# Patient Record
Sex: MALE | Race: WHITE | NOT HISPANIC OR LATINO | Employment: FULL TIME | URBAN - METROPOLITAN AREA
[De-identification: names, ages, dates, MRNs, and addresses within clinical notes are randomized per-mention and may not be internally consistent; named-entity substitution may affect disease eponyms.]

---

## 2022-05-01 ENCOUNTER — APPOINTMENT (EMERGENCY)
Dept: RADIOLOGY | Facility: HOSPITAL | Age: 42
End: 2022-05-01
Payer: COMMERCIAL

## 2022-05-01 ENCOUNTER — APPOINTMENT (EMERGENCY)
Dept: CT IMAGING | Facility: HOSPITAL | Age: 42
End: 2022-05-01
Payer: COMMERCIAL

## 2022-05-01 ENCOUNTER — HOSPITAL ENCOUNTER (OUTPATIENT)
Facility: HOSPITAL | Age: 42
Setting detail: OBSERVATION
Discharge: LEFT BEFORE DISCHARGE | End: 2022-05-02
Attending: EMERGENCY MEDICINE | Admitting: INTERNAL MEDICINE
Payer: COMMERCIAL

## 2022-05-01 DIAGNOSIS — L03.90 CELLULITIS: Primary | ICD-10-CM

## 2022-05-01 PROBLEM — E11.9 DIABETES MELLITUS (HCC): Status: ACTIVE | Noted: 2022-05-01

## 2022-05-01 PROBLEM — E66.09 CLASS 1 OBESITY DUE TO EXCESS CALORIES IN ADULT: Status: ACTIVE | Noted: 2022-05-01

## 2022-05-01 PROBLEM — L08.9 DIABETIC FOOT INFECTION (HCC): Status: ACTIVE | Noted: 2022-05-01

## 2022-05-01 PROBLEM — E66.811 CLASS 1 OBESITY DUE TO EXCESS CALORIES IN ADULT: Status: ACTIVE | Noted: 2022-05-01

## 2022-05-01 PROBLEM — E87.1 HYPONATREMIA: Status: ACTIVE | Noted: 2022-05-01

## 2022-05-01 PROBLEM — F11.10 OPIOID ABUSE (HCC): Status: ACTIVE | Noted: 2022-05-01

## 2022-05-01 PROBLEM — E11.628 DIABETIC FOOT INFECTION (HCC): Status: ACTIVE | Noted: 2022-05-01

## 2022-05-01 LAB
ALBUMIN SERPL BCP-MCNC: 3.8 G/DL (ref 3.5–5)
ALP SERPL-CCNC: 97 U/L (ref 46–116)
ALT SERPL W P-5'-P-CCNC: 40 U/L (ref 12–78)
ANION GAP SERPL CALCULATED.3IONS-SCNC: 10 MMOL/L (ref 4–13)
APTT PPP: 35 SECONDS (ref 23–37)
AST SERPL W P-5'-P-CCNC: 9 U/L (ref 5–45)
ATRIAL RATE: 97 BPM
BASOPHILS # BLD AUTO: 0.08 THOUSANDS/ΜL (ref 0–0.1)
BASOPHILS NFR BLD AUTO: 1 % (ref 0–1)
BILIRUB SERPL-MCNC: 0.42 MG/DL (ref 0.2–1)
BUN SERPL-MCNC: 15 MG/DL (ref 5–25)
CALCIUM SERPL-MCNC: 9.2 MG/DL (ref 8.3–10.1)
CHLORIDE SERPL-SCNC: 94 MMOL/L (ref 100–108)
CO2 SERPL-SCNC: 27 MMOL/L (ref 21–32)
CREAT SERPL-MCNC: 1.21 MG/DL (ref 0.6–1.3)
EOSINOPHIL # BLD AUTO: 0.22 THOUSAND/ΜL (ref 0–0.61)
EOSINOPHIL NFR BLD AUTO: 2 % (ref 0–6)
ERYTHROCYTE [DISTWIDTH] IN BLOOD BY AUTOMATED COUNT: 12.6 % (ref 11.6–15.1)
GFR SERPL CREATININE-BSD FRML MDRD: 74 ML/MIN/1.73SQ M
GLUCOSE SERPL-MCNC: 289 MG/DL (ref 65–140)
GLUCOSE SERPL-MCNC: 296 MG/DL (ref 65–140)
HCT VFR BLD AUTO: 42.2 % (ref 36.5–49.3)
HGB BLD-MCNC: 14 G/DL (ref 12–17)
IMM GRANULOCYTES # BLD AUTO: 0.06 THOUSAND/UL (ref 0–0.2)
IMM GRANULOCYTES NFR BLD AUTO: 0 % (ref 0–2)
INR PPP: 1.1 (ref 0.84–1.19)
LACTATE SERPL-SCNC: 1.4 MMOL/L (ref 0.5–2)
LYMPHOCYTES # BLD AUTO: 3.39 THOUSANDS/ΜL (ref 0.6–4.47)
LYMPHOCYTES NFR BLD AUTO: 24 % (ref 14–44)
MAGNESIUM SERPL-MCNC: 1.9 MG/DL (ref 1.6–2.6)
MCH RBC QN AUTO: 29.5 PG (ref 26.8–34.3)
MCHC RBC AUTO-ENTMCNC: 33.2 G/DL (ref 31.4–37.4)
MCV RBC AUTO: 89 FL (ref 82–98)
MONOCYTES # BLD AUTO: 1.05 THOUSAND/ΜL (ref 0.17–1.22)
MONOCYTES NFR BLD AUTO: 8 % (ref 4–12)
NEUTROPHILS # BLD AUTO: 9.12 THOUSANDS/ΜL (ref 1.85–7.62)
NEUTS SEG NFR BLD AUTO: 65 % (ref 43–75)
NRBC BLD AUTO-RTO: 0 /100 WBCS
P AXIS: 63 DEGREES
PLATELET # BLD AUTO: 359 THOUSANDS/UL (ref 149–390)
PMV BLD AUTO: 9.4 FL (ref 8.9–12.7)
POTASSIUM SERPL-SCNC: 4.2 MMOL/L (ref 3.5–5.3)
PR INTERVAL: 160 MS
PROCALCITONIN SERPL-MCNC: 0.41 NG/ML
PROT SERPL-MCNC: 8.5 G/DL (ref 6.4–8.2)
PROTHROMBIN TIME: 13.7 SECONDS (ref 11.6–14.5)
QRS AXIS: 62 DEGREES
QRSD INTERVAL: 82 MS
QT INTERVAL: 352 MS
QTC INTERVAL: 447 MS
RBC # BLD AUTO: 4.74 MILLION/UL (ref 3.88–5.62)
SODIUM SERPL-SCNC: 131 MMOL/L (ref 136–145)
T WAVE AXIS: 43 DEGREES
VENTRICULAR RATE: 97 BPM
WBC # BLD AUTO: 13.92 THOUSAND/UL (ref 4.31–10.16)

## 2022-05-01 PROCEDURE — 36415 COLL VENOUS BLD VENIPUNCTURE: CPT | Performed by: EMERGENCY MEDICINE

## 2022-05-01 PROCEDURE — 82948 REAGENT STRIP/BLOOD GLUCOSE: CPT

## 2022-05-01 PROCEDURE — 99220 PR INITIAL OBSERVATION CARE/DAY 70 MINUTES: CPT | Performed by: INTERNAL MEDICINE

## 2022-05-01 PROCEDURE — 73590 X-RAY EXAM OF LOWER LEG: CPT

## 2022-05-01 PROCEDURE — 87040 BLOOD CULTURE FOR BACTERIA: CPT | Performed by: EMERGENCY MEDICINE

## 2022-05-01 PROCEDURE — 99285 EMERGENCY DEPT VISIT HI MDM: CPT

## 2022-05-01 PROCEDURE — 96365 THER/PROPH/DIAG IV INF INIT: CPT

## 2022-05-01 PROCEDURE — 85610 PROTHROMBIN TIME: CPT | Performed by: EMERGENCY MEDICINE

## 2022-05-01 PROCEDURE — 73630 X-RAY EXAM OF FOOT: CPT

## 2022-05-01 PROCEDURE — 99285 EMERGENCY DEPT VISIT HI MDM: CPT | Performed by: EMERGENCY MEDICINE

## 2022-05-01 PROCEDURE — 83735 ASSAY OF MAGNESIUM: CPT | Performed by: EMERGENCY MEDICINE

## 2022-05-01 PROCEDURE — 84145 PROCALCITONIN (PCT): CPT | Performed by: EMERGENCY MEDICINE

## 2022-05-01 PROCEDURE — 85025 COMPLETE CBC W/AUTO DIFF WBC: CPT | Performed by: EMERGENCY MEDICINE

## 2022-05-01 PROCEDURE — 93010 ELECTROCARDIOGRAM REPORT: CPT | Performed by: INTERNAL MEDICINE

## 2022-05-01 PROCEDURE — 73701 CT LOWER EXTREMITY W/DYE: CPT

## 2022-05-01 PROCEDURE — 93005 ELECTROCARDIOGRAM TRACING: CPT

## 2022-05-01 PROCEDURE — 85730 THROMBOPLASTIN TIME PARTIAL: CPT | Performed by: EMERGENCY MEDICINE

## 2022-05-01 PROCEDURE — 80053 COMPREHEN METABOLIC PANEL: CPT | Performed by: EMERGENCY MEDICINE

## 2022-05-01 PROCEDURE — G1004 CDSM NDSC: HCPCS

## 2022-05-01 PROCEDURE — 83605 ASSAY OF LACTIC ACID: CPT | Performed by: EMERGENCY MEDICINE

## 2022-05-01 PROCEDURE — 96361 HYDRATE IV INFUSION ADD-ON: CPT

## 2022-05-01 RX ORDER — DICYCLOMINE HYDROCHLORIDE 10 MG/1
10 CAPSULE ORAL EVERY 6 HOURS PRN
Status: DISCONTINUED | OUTPATIENT
Start: 2022-05-01 | End: 2022-05-02 | Stop reason: HOSPADM

## 2022-05-01 RX ORDER — IBUPROFEN 600 MG/1
600 TABLET ORAL EVERY 6 HOURS PRN
Status: DISCONTINUED | OUTPATIENT
Start: 2022-05-01 | End: 2022-05-02 | Stop reason: HOSPADM

## 2022-05-01 RX ORDER — TRAZODONE HYDROCHLORIDE 50 MG/1
50 TABLET ORAL
Status: DISCONTINUED | OUTPATIENT
Start: 2022-05-01 | End: 2022-05-02 | Stop reason: HOSPADM

## 2022-05-01 RX ORDER — HYDROXYZINE HYDROCHLORIDE 25 MG/1
25 TABLET, FILM COATED ORAL EVERY 6 HOURS PRN
Status: DISCONTINUED | OUTPATIENT
Start: 2022-05-01 | End: 2022-05-02 | Stop reason: HOSPADM

## 2022-05-01 RX ORDER — GLIPIZIDE 5 MG/1
10 TABLET ORAL
Status: DISCONTINUED | OUTPATIENT
Start: 2022-05-02 | End: 2022-05-02 | Stop reason: HOSPADM

## 2022-05-01 RX ORDER — NICOTINE 21 MG/24HR
1 PATCH, TRANSDERMAL 24 HOURS TRANSDERMAL DAILY
Status: DISCONTINUED | OUTPATIENT
Start: 2022-05-02 | End: 2022-05-02 | Stop reason: HOSPADM

## 2022-05-01 RX ORDER — INSULIN GLARGINE 100 [IU]/ML
30 INJECTION, SOLUTION SUBCUTANEOUS
Status: DISCONTINUED | OUTPATIENT
Start: 2022-05-01 | End: 2022-05-02 | Stop reason: HOSPADM

## 2022-05-01 RX ORDER — ENOXAPARIN SODIUM 100 MG/ML
40 INJECTION SUBCUTANEOUS DAILY
Status: DISCONTINUED | OUTPATIENT
Start: 2022-05-02 | End: 2022-05-02 | Stop reason: HOSPADM

## 2022-05-01 RX ORDER — ONDANSETRON 2 MG/ML
4 INJECTION INTRAMUSCULAR; INTRAVENOUS EVERY 6 HOURS PRN
Status: DISCONTINUED | OUTPATIENT
Start: 2022-05-01 | End: 2022-05-02 | Stop reason: HOSPADM

## 2022-05-01 RX ORDER — INSULIN LISPRO 100 [IU]/ML
2-12 INJECTION, SOLUTION INTRAVENOUS; SUBCUTANEOUS
Status: DISCONTINUED | OUTPATIENT
Start: 2022-05-02 | End: 2022-05-02 | Stop reason: HOSPADM

## 2022-05-01 RX ORDER — LANOLIN ALCOHOL/MO/W.PET/CERES
3 CREAM (GRAM) TOPICAL
Status: DISCONTINUED | OUTPATIENT
Start: 2022-05-01 | End: 2022-05-02 | Stop reason: HOSPADM

## 2022-05-01 RX ORDER — CLINDAMYCIN PHOSPHATE 600 MG/50ML
600 INJECTION INTRAVENOUS ONCE
Status: COMPLETED | OUTPATIENT
Start: 2022-05-01 | End: 2022-05-01

## 2022-05-01 RX ORDER — LOPERAMIDE HYDROCHLORIDE 2 MG/1
4 CAPSULE ORAL 3 TIMES DAILY PRN
Status: DISCONTINUED | OUTPATIENT
Start: 2022-05-01 | End: 2022-05-02 | Stop reason: HOSPADM

## 2022-05-01 RX ORDER — BUPRENORPHINE 2 MG/1
6 TABLET SUBLINGUAL ONCE
Status: COMPLETED | OUTPATIENT
Start: 2022-05-01 | End: 2022-05-01

## 2022-05-01 RX ORDER — CLONIDINE HYDROCHLORIDE 0.1 MG/1
0.1 TABLET ORAL EVERY 8 HOURS PRN
Status: DISCONTINUED | OUTPATIENT
Start: 2022-05-01 | End: 2022-05-02 | Stop reason: HOSPADM

## 2022-05-01 RX ORDER — BUPRENORPHINE 2 MG/1
4 TABLET SUBLINGUAL 2 TIMES DAILY
Status: DISCONTINUED | OUTPATIENT
Start: 2022-05-02 | End: 2022-05-02 | Stop reason: HOSPADM

## 2022-05-01 RX ORDER — GABAPENTIN 300 MG/1
300 CAPSULE ORAL 3 TIMES DAILY
Status: DISCONTINUED | OUTPATIENT
Start: 2022-05-01 | End: 2022-05-02 | Stop reason: HOSPADM

## 2022-05-01 RX ADMIN — IOHEXOL 100 ML: 350 INJECTION, SOLUTION INTRAVENOUS at 16:58

## 2022-05-01 RX ADMIN — GABAPENTIN 300 MG: 300 CAPSULE ORAL at 20:27

## 2022-05-01 RX ADMIN — INSULIN GLARGINE 30 UNITS: 100 INJECTION, SOLUTION SUBCUTANEOUS at 22:22

## 2022-05-01 RX ADMIN — VANCOMYCIN HYDROCHLORIDE 1500 MG: 1 INJECTION, POWDER, LYOPHILIZED, FOR SOLUTION INTRAVENOUS at 22:16

## 2022-05-01 RX ADMIN — Medication 3 MG: at 22:28

## 2022-05-01 RX ADMIN — SODIUM CHLORIDE 1000 ML: 0.9 INJECTION, SOLUTION INTRAVENOUS at 16:03

## 2022-05-01 RX ADMIN — PIPERACILLIN AND TAZOBACTAM 4.5 G: 4; .5 INJECTION, POWDER, LYOPHILIZED, FOR SOLUTION INTRAVENOUS at 20:44

## 2022-05-01 RX ADMIN — IBUPROFEN 600 MG: 600 TABLET ORAL at 20:30

## 2022-05-01 RX ADMIN — BUPRENORPHINE 6 MG: 2 TABLET SUBLINGUAL at 20:27

## 2022-05-01 RX ADMIN — CLINDAMYCIN PHOSPHATE 600 MG: 600 INJECTION, SOLUTION INTRAVENOUS at 16:08

## 2022-05-01 NOTE — H&P
[address]  H&PDona Nora 1980, 37 y o  male MRN: [id number]  Unit/Bed#: -[place] Encounter: [id number]  Primary Care Provider: No primary care provider on file  Date and time admitted to hospital: 5/1/2022  3:03 PM    * Diabetic foot infection [facility])  Assessment & Plan  Lab Results   Component Value Date    HGBA1C 10 0 (H) 04/25/2022     He with progressive left lower extremity edema, warmth, erythema, with ist/2nd toe ulceration with missed doses cephalexin and Bactrim while at [facility] detox center found with leukocytosis, elevated procalcitonin with CT lower extremity plantar ulcer  first toe interphalangeal joint with adjacent soft tissue swelling and increased vascularity, no abscess, foreign body or osteo  -will give vancomycin and Zosyn  -follow-up blood culture  -avoid opioids for pain control  -continue ibuprofen p r n , acetaminophen p r n   -ID and Podiatry consult  -consider MRI foot tomorrow          Class 1 obesity due to excess calories in adult  Assessment & Plan  Patient counseled on lifestyle modification with diet, exercise and weight loss    Diabetes mellitus (HCC)  Assessment & Plan  Lab Results   Component Value Date    HGBA1C 10 0 (H) 04/25/2022   Uncontrolled with A1c 10  Continue Lantus 30 units at bedtime, glipizide 10 mg b i d    Will hold metformin post IV contrast  On gentle IVF  Add insulin sliding scale, Accu-Chek      Recent Labs     05/01/22  1634   POCGLU 296*       Blood Sugar Average: Last 72 hrs:  (P) 296    Opioid abuse ([facility] [address] )  Assessment & Plan  Patient checked into [facility] for fentanyl and methamphetamine detox last Wednesday  -no signs of withdrawal  -continue current regimen buprenorphine, clonidine p r n , gabapentin tid  -supportive care     Hyponatremia  Assessment & Plan  Corrected [id number] for glucose  Optimize glycemic control    VTE Pharmacologic Prophylaxis: VTE Score: 5 High Risk (Score >/= 5) - Pharmacological DVT Prophylaxis Ordered: enoxaparin (Lovenox)  Sequential Compression Devices Ordered  Code Status: Level 1 - Full Code   Discussion with family: Patient declined call to   Anticipated Length of Stay: Patient will be admitted on an inpatient basis with an anticipated length of stay of greater than 2 midnights secondary to Diabetic foot infection  Total Time for Visit, including Counseling / Coordination of Care: 60 minutes Greater than 50% of this total time spent on direct patient counseling and coordination of care  Chief Complaint: left leg infection    History of Present Illness:  Dany Chase is a 37 y o  male with a PMH of DMII, opioid abuse who presents from [facility] with worsening left leg swelling, redness, warmth and pain  Patient was seen at [facility] ED on 4/21 diagnosed with cellulitis discharge on Bactrim and Keflex  Patient checked in to rehab for fentanyl and methamphetamine abuse [facility] last Wednesday reports he has not been given his antibiotics regularly  Today, he noted increased leg swelling, redness warmth took acetaminophen with no relief  He had fever 100 yesterday  He denies chills, nausea, vomiting, chest pain, shortness of breath or any other complaint  He reports history of cellulitis many years ago  Review of Systems:  Review of Systems  Comprehensive review of systems negative except in above HPI  Past Medical and Surgical History:   Past Medical History:   Diagnosis Date    Class 1 obesity in adult     Diabetes mellitus ([facility] [address] )     Opioid abuse ([facility] [address] )        Past Surgical History:   Procedure Laterality Date    NO PAST SURGERIES N/A        Meds/Allergies:  Prior to Admission medications    Not on File     I have reviewed home medications with patient personally      Allergies: No Known Allergies    Social History:  Marital Status:    Occupation:   Patient Pre-hospital Living Situation: Skilled Nursing Facility: [address] [place] [address]  Patient Pre-hospital Level of Mobility: walks  Patient Pre-hospital Diet Restrictions:   Substance Use History:   Social History     Substance and Sexual Activity   Alcohol Use Not Currently     Social History     Tobacco Use   Smoking Status Current Every Day Smoker   Smokeless Tobacco Never Used     Social History     Substance and Sexual Activity   Drug Use Not Currently    Types: Fentanyl       Family History:  Family History   Problem Relation Age of Onset    Diabetes Mother        Physical Exam:     Vitals:   Blood Pressure: 131/79 (05/01/22 1954)  Pulse: 97 (05/01/22 1954)  Temperature: 100 2 °F (37 9 °C) (05/01/22 1954)  Temp Source: Oral (05/01/22 1815)  Respirations: 20 (05/01/22 1815)  Height: 6' 5" (195 6 cm) (05/01/22 1434)  Weight - Scale: 120 kg (265 lb) (05/01/22 1434)  SpO2: 94 % (05/01/22 1954)    Physical Exam  Vitals and nursing note reviewed  Constitutional:       Appearance: Normal appearance  He is well-developed  HENT:      Head: Normocephalic and atraumatic  Eyes:      Extraocular Movements: Extraocular movements intact  Conjunctiva/sclera: Conjunctivae normal       Pupils: Pupils are equal, round, and reactive to light  Cardiovascular:      Rate and Rhythm: Normal rate and regular rhythm  Pulses: Normal pulses  Heart sounds: Normal heart sounds  No murmur heard  Pulmonary:      Effort: Pulmonary effort is normal  No respiratory distress  Breath sounds: Normal breath sounds  Abdominal:      General: Abdomen is flat  Bowel sounds are normal  There is no distension  Palpations: Abdomen is soft  Tenderness: There is no abdominal tenderness  Musculoskeletal:      Cervical back: Neck supple  Left lower leg: Edema present  Skin:     General: Skin is warm and dry        Comments: Diffuse left lower extremity erythema, warmth, mild tenderness with lymphanitis streak upto thigh  Left Ist/2nd dorsal toes ulceration, mild tenderness, no expose bone   Neurological:      Mental Status: He is alert     Psychiatric:         Mood and Affect: Mood normal          Behavior: Behavior normal           Additional Data:     Lab Results:  Results from last 7 days   Lab Units 05/01/22  1735   WBC Thousand/uL 13 92*   HEMOGLOBIN g/dL 14 0   HEMATOCRIT % 42 2   PLATELETS Thousands/uL 359   NEUTROS PCT % 65   LYMPHS PCT % 24   MONOS PCT % 8   EOS PCT % 2     Results from last 7 days   Lab Units 05/01/22  1602   SODIUM mmol/L 131*   POTASSIUM mmol/L 4 2   CHLORIDE mmol/L 94*   CO2 mmol/L 27   BUN mg/dL 15   CREATININE mg/dL 1 21   ANION GAP mmol/L 10   CALCIUM mg/dL 9 2   ALBUMIN g/dL 3 8   TOTAL BILIRUBIN mg/dL 0 42   ALK PHOS U/L 97   ALT U/L 40   AST U/L 9   GLUCOSE RANDOM mg/dL 289*     Results from last 7 days   Lab Units 05/01/22  1602   INR  1 10     Results from last 7 days   Lab Units 05/01/22  1634   POC GLUCOSE mg/dl 296*     Results from last 7 days   Lab Units 04/25/22  1439   HEMOGLOBIN A1C % 10 0*     Results from last 7 days   Lab Units 05/01/22  1602   LACTIC ACID mmol/L 1 4   PROCALCITONIN ng/ml 0 41*       Imaging: Reviewed radiology reports from this admission including: CT lower extremity  CT lower extremity w contrast left   Final Result by Natacha Gonzales MD (05/01 1804)      Plantar first toe ulceration, without evidence of underlying osteomyelitis, acute fracture, or radiopaque foreign body      Diffuse soft tissue swelling is seen particularly about the ulcer site, without evidence of generalized soft tissue air or focal abscess         Workstation performed: [id number]         XR tibia fibula 2 views LEFT   ED Interpretation by Jacques Torres DO (05/01 1635)    concern for possible soft tissue gas, will follow-up CT      XR foot 3+ views LEFT   ED Interpretation by Jacques Torres DO (05/01 1635)   No elie osteomyelitis seen, no gas          EKG and Other Studies Reviewed on Admission: ·   · Please Note: This note has been constructed using a voice recognition system   **

## 2022-05-01 NOTE — ED PROVIDER NOTES
History  Chief Complaint   Patient presents with    Leg Swelling     redness and swelling in left leg was taking antibiotics but missed a few doses     Patient is a 50-year-old male past medical history diabetes presenting with left leg swelling  Patient states that he was seen on 04/21 at outside hospital and diagnosed with skin infection, placed on antibiotics and was told to return next day via phone call as there was concern for osteomyelitis on x-ray he would require CT  Patient did not stay for CT that day and states that he has been taking his Augmentin and Bactrim however when he 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 he was not given his antibiotics regularly  He states that today he began with swelling and redness spreading up from his foot up his leg and states aching pain which is constant and radiating upward  Worse with pressure  He states he is taking Tylenol with no relief  He had a fever yesterday to 100  Did not take any medications today  Denies any chest pain, shortness of breath, rashes, vision changes, dizziness, nausea vomiting, dysuria  None       Past Medical History:   Diagnosis Date    Class 1 obesity in adult     Diabetes mellitus (HCC)     Opioid abuse ([facility] [address] )        Past Surgical History:   Procedure Laterality Date    NO PAST SURGERIES N/A        Family History   Problem Relation Age of Onset    Diabetes Mother      I have reviewed and agree with the history as documented  E-Cigarette/Vaping     E-Cigarette/Vaping Substances     Social History     Tobacco Use    Smoking status: Current Every Day Smoker    Smokeless tobacco: Never Used   Substance Use Topics    Alcohol use: Not Currently    Drug use: Not Currently     Types: Fentanyl       Review of Systems   All other systems reviewed and are negative  Physical Exam  Physical Exam  Vitals reviewed  Constitutional:       General: He is not in acute distress       Appearance: Normal appearance  He is not ill-appearing  HENT:      Mouth/Throat:      Mouth: Mucous membranes are moist    Eyes:      Conjunctiva/sclera: Conjunctivae normal    Cardiovascular:      Rate and Rhythm: Normal rate and regular rhythm  Heart sounds: Normal heart sounds  Pulmonary:      Effort: Pulmonary effort is normal       Breath sounds: Normal breath sounds  Abdominal:      General: Abdomen is flat  Palpations: Abdomen is soft  Tenderness: There is no abdominal tenderness  Musculoskeletal:         General: Swelling present  Normal range of motion  Cervical back: Neck supple  Comments: Patient has stage II ulcer to the left great toe and also to the right great toe with erythema and +1-2 2 pitting edema from foot to mid calf, erythema only to the anterior and medial portions of the calf, not circumferential with streaking up to mid thigh, intact pulses, sensation, no crepitus   Skin:     General: Skin is warm and dry  Capillary Refill: Capillary refill takes less than 2 seconds  Neurological:      General: No focal deficit present  Mental Status: He is alert  Sensory: No sensory deficit  Motor: No weakness     Psychiatric:         Mood and Affect: Mood normal          Vital Signs  ED Triage Vitals   Temperature Pulse Respirations Blood Pressure SpO2   05/01/22 1433 05/01/22 1434 05/01/22 1433 05/01/22 1434 05/01/22 1434   98 °F (36 7 °C) (!) 109 18 152/74 98 %      Temp Source Heart Rate Source Patient Position - Orthostatic VS BP Location FiO2 (%)   05/01/22 1433 05/01/22 1700 05/01/22 1433 05/01/22 1433 --   Tympanic Monitor Sitting Left arm       Pain Score       05/01/22 2027       5           Vitals:    05/01/22 1434 05/01/22 1700 05/01/22 1815 05/01/22 1954   BP: 152/74 153/72 153/72 131/79   Pulse: (!) 109 96 93 97   Patient Position - Orthostatic VS: Sitting Sitting Lying          Visual Acuity      ED Medications  Medications   nicotine (NICODERM CQ) 14 mg/24hr TD 24 hr patch 1 patch (has no administration in time range)   enoxaparin (LOVENOX) subcutaneous injection 40 mg (has no administration in time range)   cloNIDine (CATAPRES) tablet 0 1 mg (has no administration in time range)   dicyclomine (BENTYL) capsule 10 mg (has no administration in time range)   hydrOXYzine HCL (ATARAX) tablet 25 mg (has no administration in time range)   ibuprofen (MOTRIN) tablet 600 mg (600 mg Oral Given 5/1/22 2030)   loperamide (IMODIUM) capsule 4 mg (has no administration in time range)   melatonin tablet 3 mg (has no administration in time range)   ondansetron (ZOFRAN) injection 4 mg (has no administration in time range)   traZODone (DESYREL) tablet 50 mg (has no administration in time range)   insulin glargine (LANTUS) subcutaneous injection 30 Units 0 3 mL (has no administration in time range)   losartan potassium-hydrochlorothiazide (HYZAAR 100/25) combo dose (has no administration in time range)   glipiZIDE (GLUCOTROL) tablet 10 mg (has no administration in time range)   gabapentin (NEURONTIN) capsule 300 mg (300 mg Oral Given 5/1/22 2027)   buprenorphine (SUBUTEX) 2 mg SL tablet 4 mg (has no administration in time range)   insulin lispro (HumaLOG) 100 units/mL subcutaneous injection 2-12 Units (has no administration in time range)   piperacillin-tazobactam (ZOSYN) 4 5 g in sodium chloride 0 9 % 100 mL IVPB (4 5 g Intravenous New Bag 5/1/22 2044)   vancomycin (VANCOCIN) 1500 mg in sodium chloride 0 9% 250 mL IVPB (has no administration in time range)   clindamycin (CLEOCIN) IVPB (premix in dextrose) 600 mg 50 mL (0 mg Intravenous Stopped 5/1/22 1645)   sodium chloride 0 9 % bolus 1,000 mL (0 mL Intravenous Stopped 5/1/22 1819)   iohexol (OMNIPAQUE) 350 MG/ML injection (SINGLE-DOSE) 100 mL (100 mL Intravenous Given 5/1/22 1658)   buprenorphine (SUBUTEX) 2 mg SL tablet 6 mg (6 mg Sublingual Given 5/1/22 2027)       Diagnostic Studies  Results Reviewed     Procedure Component Value Units Date/Time    Platelet count [[id number]]     Lab Status: No result Specimen: Blood     CBC and differential [[id number]]  (Abnormal) Collected: 05/01/22 1735    Lab Status: Final result Specimen: Blood from Arm, Right Updated: 05/01/22 1822     WBC 13 92 Thousand/uL      RBC 4 74 Million/uL      Hemoglobin 14 0 g/dL      Hematocrit 42 2 %      MCV 89 fL      MCH 29 5 pg      MCHC 33 2 g/dL      RDW 12 6 %      MPV 9 4 fL      Platelets 119 Thousands/uL      nRBC 0 /100 WBCs      Neutrophils Relative 65 %      Immat GRANS % 0 %      Lymphocytes Relative 24 %      Monocytes Relative 8 %      Eosinophils Relative 2 %      Basophils Relative 1 %      Neutrophils Absolute 9 12 Thousands/µL      Immature Grans Absolute 0 06 Thousand/uL      Lymphocytes Absolute 3 39 Thousands/µL      Monocytes Absolute 1 05 Thousand/µL      Eosinophils Absolute 0 22 Thousand/µL      Basophils Absolute 0 08 Thousands/µL     Procalcitonin [[id number]]  (Abnormal) Collected: 05/01/22 1602    Lab Status: Final result Specimen: Blood from Arm, Right Updated: 05/01/22 1643     Procalcitonin 0 41 ng/ml     Fingerstick Glucose (POCT) [[id number]]  (Abnormal) Collected: 05/01/22 1634    Lab Status: Final result Updated: 05/01/22 1636     POC Glucose 296 mg/dl     Lactic acid [[id number]]  (Normal) Collected: 05/01/22 1602    Lab Status: Final result Specimen: Blood from Arm, Right Updated: 05/01/22 1635     LACTIC ACID 1 4 mmol/L     Narrative:      Result may be elevated if tourniquet was used during collection      Comprehensive metabolic panel [[id number]]  (Abnormal) Collected: 05/01/22 1602    Lab Status: Final result Specimen: Blood from Arm, Right Updated: 05/01/22 1633     Sodium 131 mmol/L      Potassium 4 2 mmol/L      Chloride 94 mmol/L      CO2 27 mmol/L      ANION GAP 10 mmol/L      BUN 15 mg/dL      Creatinine 1 21 mg/dL      Glucose 289 mg/dL      Calcium 9 2 mg/dL      AST 9 U/L      ALT 40 U/L      Alkaline Phosphatase 97 U/L Total Protein 8 5 g/dL      Albumin 3 8 g/dL      Total Bilirubin 0 42 mg/dL      eGFR 74 ml/min/1 73sq m     Narrative:      [place] guidelines for Chronic Kidney Disease (CKD):     Stage 1 with normal or high GFR (GFR > 90 mL/min/1 73 square meters)    Stage 2 Mild CKD (GFR = 60-89 mL/min/1 73 square meters)    Stage 3A Moderate CKD (GFR = 45-59 mL/min/1 73 square meters)    Stage 3B Moderate CKD (GFR = 30-44 mL/min/1 73 square meters)    Stage 4 Severe CKD (GFR = 15-29 mL/min/1 73 square meters)    Stage 5 End Stage CKD (GFR <15 mL/min/1 73 square meters)  Note: GFR calculation is accurate only with a steady state creatinine    Magnesium [[id number]]  (Normal) Collected: 05/01/22 1602    Lab Status: Final result Specimen: Blood from Arm, Right Updated: 05/01/22 1633     Magnesium 1 9 mg/dL     Protime-INR [[id number]]  (Normal) Collected: 05/01/22 1602    Lab Status: Final result Specimen: Blood from Arm, Right Updated: 05/01/22 1629     Protime 13 7 seconds      INR 1 10    APTT [[id number]]  (Normal) Collected: 05/01/22 1602    Lab Status: Final result Specimen: Blood from Arm, Right Updated: 05/01/22 1629     PTT 35 seconds     Blood culture #2 [[id number]] Collected: 05/01/22 1602    Lab Status: In process Specimen: Blood from Arm, Left Updated: 05/01/22 1609    Blood culture #1 [[id number]] Collected: 05/01/22 1602    Lab Status:  In process Specimen: Blood from Arm, Right Updated: 05/01/22 1609                 CT lower extremity w contrast left   Final Result by Philippe Massey MD (05/01 1804)      Plantar first toe ulceration, without evidence of underlying osteomyelitis, acute fracture, or radiopaque foreign body      Diffuse soft tissue swelling is seen particularly about the ulcer site, without evidence of generalized soft tissue air or focal abscess         Workstation performed: [id number]         XR tibia fibula 2 views LEFT   ED Interpretation by Vern Alvarenga DO (05/01 1635)    concern for possible soft tissue gas, will follow-up CT      XR foot 3+ views LEFT   ED Interpretation by Michael Ruiz DO (05/01 1635)   No elie osteomyelitis seen, no gas                 Procedures  ECG 12 Lead Documentation Only    Date/Time: 5/1/2022 4:12 PM  Performed by: Michael Ruiz DO  Authorized by: Michael Ruiz DO     ECG reviewed by me, the ED Provider: yes    Patient location:  ED  Previous ECG:     Previous ECG:  Unavailable  Interpretation:     Interpretation: normal    Rate:     ECG rate assessment: normal    Rhythm:     Rhythm: sinus rhythm    Ectopy:     Ectopy: none    QRS:     QRS axis:  Normal    QRS intervals:  Normal  Conduction:     Conduction: normal    ST segments:     ST segments:  Normal  T waves:     T waves: normal               ED Course                               SBIRT 22yo+      Most Recent Value   SBIRT (22 yo +)    In order to provide better care to our patients, we are screening all of our patients for alcohol and drug use  Would it be okay to ask you these screening questions? Yes Filed at: 05/01/2022 1615   Initial Alcohol Screen: US AUDIT-C     1  How often do you have a drink containing alcohol? 0 Filed at: 05/01/2022 1615   2  How many drinks containing alcohol do you have on a typical day you are drinking? 0 Filed at: 05/01/2022 1615   3a  Male UNDER 65: How often do you have five or more drinks on one occasion? 0 Filed at: 05/01/2022 1615   3b  FEMALE Any Age, or MALE 65+: How often do you have 4 or more drinks on one occassion? 0 Filed at: 05/01/2022 1615   Audit-C Score 0 Filed at: 05/01/2022 1615   DONTAE: How many times in the past year have you    Used an illegal drug or used a prescription medication for non-medical reasons?  Never Filed at: 05/01/2022 1615                    MDM  Number of Diagnoses or Management Options  Diagnosis management comments: Patient is a 30-year-old male past medical history diabetes presenting with cellulitis, rule out osteomyelitis  Patient is well-appearing bedside though with tachycardia but in no acute distress  He has erythema which is not circumferential to the left lower extremity spreading from foot to calf to mid thigh with streaking  Will give antibiotics as patient is cellulitis with failure of outpatient antibiotics, obtain CT of the lower extremity to assess for osteomyelitis as chart from outside hospital reveals that there was concern based on x-ray, obtain septic workup and admit  Disposition  Final diagnoses:   Cellulitis     Time reflects when diagnosis was documented in both MDM as applicable and the Disposition within this note     Time User Action Codes Description Comment    5/1/2022  6:32 PM Ceci Tiwari Add [L03 90] Cellulitis       ED Disposition     ED Disposition Condition Date/Time Comment    Admit Stable Sun May 1, 2022  6:32 PM Case was discussed with Dr Joslyn Montano and the patient's admission status was agreed to be Admission Status: observation status to the service of Dr Joslyn Montano   Follow-up Information    None         There are no discharge medications for this patient  No discharge procedures on file      PDMP Review     None          ED Provider  Electronically Signed by           Bryon William DO  05/01/22 6473

## 2022-05-01 NOTE — ED NOTES
Patient transported to [facility], [address] [place] [address]  05/01/22 3684

## 2022-05-02 VITALS
HEIGHT: 77 IN | BODY MASS INDEX: 31.29 KG/M2 | WEIGHT: 265 LBS | SYSTOLIC BLOOD PRESSURE: 139 MMHG | OXYGEN SATURATION: 94 % | DIASTOLIC BLOOD PRESSURE: 77 MMHG | RESPIRATION RATE: 18 BRPM | HEART RATE: 95 BPM | TEMPERATURE: 98.3 F

## 2022-05-02 LAB
ANION GAP SERPL CALCULATED.3IONS-SCNC: 10 MMOL/L (ref 4–13)
BUN SERPL-MCNC: 12 MG/DL (ref 5–25)
CALCIUM SERPL-MCNC: 8.9 MG/DL (ref 8.3–10.1)
CHLORIDE SERPL-SCNC: 100 MMOL/L (ref 100–108)
CO2 SERPL-SCNC: 26 MMOL/L (ref 21–32)
CREAT SERPL-MCNC: 1.05 MG/DL (ref 0.6–1.3)
ERYTHROCYTE [DISTWIDTH] IN BLOOD BY AUTOMATED COUNT: 12.8 % (ref 11.6–15.1)
GFR SERPL CREATININE-BSD FRML MDRD: 88 ML/MIN/1.73SQ M
GLUCOSE P FAST SERPL-MCNC: 189 MG/DL (ref 65–99)
GLUCOSE SERPL-MCNC: 165 MG/DL (ref 65–140)
GLUCOSE SERPL-MCNC: 189 MG/DL (ref 65–140)
GLUCOSE SERPL-MCNC: 199 MG/DL (ref 65–140)
GLUCOSE SERPL-MCNC: 245 MG/DL (ref 65–140)
HCT VFR BLD AUTO: 41.5 % (ref 36.5–49.3)
HGB BLD-MCNC: 13.4 G/DL (ref 12–17)
MCH RBC QN AUTO: 28.9 PG (ref 26.8–34.3)
MCHC RBC AUTO-ENTMCNC: 32.3 G/DL (ref 31.4–37.4)
MCV RBC AUTO: 89 FL (ref 82–98)
PLATELET # BLD AUTO: 348 THOUSANDS/UL (ref 149–390)
PMV BLD AUTO: 9.7 FL (ref 8.9–12.7)
POTASSIUM SERPL-SCNC: 4.2 MMOL/L (ref 3.5–5.3)
RBC # BLD AUTO: 4.64 MILLION/UL (ref 3.88–5.62)
SODIUM SERPL-SCNC: 136 MMOL/L (ref 136–145)
WBC # BLD AUTO: 13.57 THOUSAND/UL (ref 4.31–10.16)

## 2022-05-02 PROCEDURE — 99217 PR OBSERVATION CARE DISCHARGE MANAGEMENT: CPT

## 2022-05-02 PROCEDURE — NC001 PR NO CHARGE: Performed by: PHYSICIAN ASSISTANT

## 2022-05-02 PROCEDURE — 82948 REAGENT STRIP/BLOOD GLUCOSE: CPT

## 2022-05-02 PROCEDURE — 85027 COMPLETE CBC AUTOMATED: CPT | Performed by: INTERNAL MEDICINE

## 2022-05-02 PROCEDURE — 92610 EVALUATE SWALLOWING FUNCTION: CPT

## 2022-05-02 PROCEDURE — 80048 BASIC METABOLIC PNL TOTAL CA: CPT | Performed by: INTERNAL MEDICINE

## 2022-05-02 RX ORDER — ACETAMINOPHEN 325 MG/1
650 TABLET ORAL EVERY 6 HOURS PRN
Status: DISCONTINUED | OUTPATIENT
Start: 2022-05-02 | End: 2022-05-02 | Stop reason: HOSPADM

## 2022-05-02 RX ORDER — SODIUM CHLORIDE 9 MG/ML
100 INJECTION, SOLUTION INTRAVENOUS CONTINUOUS
Status: DISCONTINUED | OUTPATIENT
Start: 2022-05-02 | End: 2022-05-02 | Stop reason: HOSPADM

## 2022-05-02 RX ADMIN — VANCOMYCIN HYDROCHLORIDE 1500 MG: 1 INJECTION, POWDER, LYOPHILIZED, FOR SOLUTION INTRAVENOUS at 15:57

## 2022-05-02 RX ADMIN — GABAPENTIN 300 MG: 300 CAPSULE ORAL at 08:25

## 2022-05-02 RX ADMIN — ACETAMINOPHEN 650 MG: 325 TABLET ORAL at 18:39

## 2022-05-02 RX ADMIN — ENOXAPARIN SODIUM 40 MG: 40 INJECTION SUBCUTANEOUS at 08:30

## 2022-05-02 RX ADMIN — VANCOMYCIN HYDROCHLORIDE 1500 MG: 1 INJECTION, POWDER, LYOPHILIZED, FOR SOLUTION INTRAVENOUS at 06:19

## 2022-05-02 RX ADMIN — INSULIN LISPRO 4 UNITS: 100 INJECTION, SOLUTION INTRAVENOUS; SUBCUTANEOUS at 12:10

## 2022-05-02 RX ADMIN — Medication 1 PATCH: at 08:26

## 2022-05-02 RX ADMIN — GLIPIZIDE 10 MG: 5 TABLET ORAL at 16:51

## 2022-05-02 RX ADMIN — PIPERACILLIN AND TAZOBACTAM 4.5 G: 4; .5 INJECTION, POWDER, LYOPHILIZED, FOR SOLUTION INTRAVENOUS at 15:13

## 2022-05-02 RX ADMIN — INSULIN LISPRO 4 UNITS: 100 INJECTION, SOLUTION INTRAVENOUS; SUBCUTANEOUS at 16:52

## 2022-05-02 RX ADMIN — PIPERACILLIN AND TAZOBACTAM 4.5 G: 4; .5 INJECTION, POWDER, LYOPHILIZED, FOR SOLUTION INTRAVENOUS at 02:42

## 2022-05-02 RX ADMIN — GLIPIZIDE 10 MG: 5 TABLET ORAL at 08:26

## 2022-05-02 RX ADMIN — BUPRENORPHINE 4 MG: 2 TABLET SUBLINGUAL at 08:34

## 2022-05-02 RX ADMIN — SODIUM CHLORIDE 100 ML/HR: 0.9 INJECTION, SOLUTION INTRAVENOUS at 06:19

## 2022-05-02 RX ADMIN — PIPERACILLIN AND TAZOBACTAM 4.5 G: 4; .5 INJECTION, POWDER, LYOPHILIZED, FOR SOLUTION INTRAVENOUS at 10:22

## 2022-05-02 RX ADMIN — GABAPENTIN 300 MG: 300 CAPSULE ORAL at 16:51

## 2022-05-02 RX ADMIN — INSULIN LISPRO 2 UNITS: 100 INJECTION, SOLUTION INTRAVENOUS; SUBCUTANEOUS at 08:31

## 2022-05-02 RX ADMIN — HYDROCHLOROTHIAZIDE: 25 TABLET ORAL at 08:26

## 2022-05-02 NOTE — ASSESSMENT & PLAN NOTE
Lab Results   Component Value Date    HGBA1C 10 0 (H) 04/25/2022     · Uncontrolled a/e/b A1c  · Continue Lantus 30 units at bedtime, glipizide 10 mg b i d   · Metformin was held during inpatient stay, and correctional SSI was utilized       Recent Labs     05/01/22  1634 05/02/22  0706 05/02/22  1129 05/02/22  1603   POCGLU 296* 165* 245* 199*       Blood Sugar Average: Last 72 hrs:  (P) 226 25

## 2022-05-02 NOTE — ASSESSMENT & PLAN NOTE
Patient checked into [facility] for fentanyl and methamphetamine detox last Wednesday  · No signs of withdrawal, monitor   · Conntinue current regimen buprenorphine, clonidine p r n , gabapentin tid  · Supportive care

## 2022-05-02 NOTE — UTILIZATION REVIEW
Initial Clinical Review    Admission: Date/Time/Statement:   Admission Orders (From admission, onward)     Ordered        05/01/22 1832  Place in Observation  Once                      Orders Placed This Encounter   Procedures    Place in Observation     Standing Status:   Standing     Number of Occurrences:   1     Order Specific Question:   Level of Care     Answer:   Med Surg [16]     ED Arrival Information     Expected Arrival Acuity    - 5/1/2022 13:17 Urgent         Means of arrival Escorted by Service Admission type    Walk-In Self Hospitalist Urgent         Arrival complaint    Left Leg Swelling         Chief Complaint   Patient presents with    Leg Swelling     redness and swelling in left leg was taking antibiotics but missed a few doses       Initial Presentation: 37 y o  male to ED from home w/ worsening L leg swelling , redness , warmth and pain   Seen 4/21 [facility] ED dx w/ cellulitis and given bactrim and keflex   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 last Wednesday reports he has not been given his antibiotics regularly  Today, he noted increased leg swelling, redness warmth took acetaminophen with no relief  He had fever 100 yesterday  Admitted OBS status w/ diabetic foot infection given vanco and zosyn , f/u BC , pain control , ID and podiatry consult , consider MRI foot   DM SSI and monitor , gentle IVF   Opoid abuse no signs of withdrawal  continue current regimen buprenorphine, clonidine p r n , gabapentin tid, supportive care   PE : Diffuse left lower extremity erythema, warmth, mild tenderness with lymphanitis streak upto thigh  Left Ist/2nd dorsal toes ulceration, mild tenderness   5/2 IM Note   Tolerating abx   Cont vanco and zosyn pending ID consult   Podiatry consult pending        ED Triage Vitals   Temperature Pulse Respirations Blood Pressure SpO2   05/01/22 1433 05/01/22 1434 05/01/22 1433 05/01/22 1434 05/01/22 1434 98 °F (36 7 °C) (!) 109 18 152/74 98 %      Temp Source Heart Rate Source Patient Position - Orthostatic VS BP Location FiO2 (%)   05/01/22 1433 05/01/22 1700 05/01/22 1433 05/01/22 1433 --   Tympanic Monitor Sitting Left arm       Pain Score       05/01/22 2027       5          Wt Readings from Last 1 Encounters:   05/01/22 120 kg (265 lb)     Additional Vital Signs:   05/02/22 07:07:27 -- 83 -- 123/80 94 93 % -- --   05/02/22 06:23:36 98 3 °F (36 8 °C) 80 -- -- -- 93 % -- --   05/01/22 22:49:40 -- 94 -- 132/80 97 96 % -- --   05/01/22 22:49:16 -- 89 -- 132/80 97 96 % -- --   05/01/22 2100 -- -- -- -- -- -- None (Room air) --   05/01/22 19:54:37 100 2 °F (37 9 °C) 97 -- 131/79 96 94 % -- --   05/01/22 1815 98 °F (36 7 °C) 93 20 153/72 -- 96 % None (Room air) Lying   05/01/22 1700 98 °F (36 7 °C) 96 20 153/72 103 99 % None (Room air) Sitting   05/01/22 1615 -- -- -- -- -- -- None (Room air) --   05/01/22 1434 -- 109 Abnormal  -- 152/74 -- 98 % None (Room air)        Pertinent Labs/Diagnostic Test Results:   5/1 EKG NSR   CT lower extremity w contrast left   Final Result by Natacha Gonzales MD (05/01 4104)      Plantar first toe ulceration, without evidence of underlying osteomyelitis, acute fracture, or radiopaque foreign body      Diffuse soft tissue swelling is seen particularly about the ulcer site, without evidence of generalized soft tissue air or focal abscess         Workstation performed: [id number]         XR tibia fibula 2 views LEFT   ED Interpretation by Jacques Torres DO (05/01 1635)    concern for possible soft tissue gas, will follow-up CT      XR foot 3+ views LEFT   ED Interpretation by Jacques Torres DO (05/01 1635)   No elie osteomyelitis seen, no gas        Results from last 7 days   Lab Units 05/02/22  0507 05/01/22  1735   WBC Thousand/uL 13 57* 13 92*   HEMOGLOBIN g/dL 13 4 14 0   HEMATOCRIT % 41 5 42 2   PLATELETS Thousands/uL 348 359   NEUTROS ABS Thousands/µL  --  9 12* Results from last 7 days   Lab Units 05/02/22  0507 05/01/22  1602   SODIUM mmol/L 136 131*   POTASSIUM mmol/L 4 2 4 2   CHLORIDE mmol/L 100 94*   CO2 mmol/L 26 27   ANION GAP mmol/L 10 10   BUN mg/dL 12 15   CREATININE mg/dL 1 05 1 21   EGFR ml/min/1 73sq m 88 74   CALCIUM mg/dL 8 9 9 2   MAGNESIUM mg/dL  --  1 9     Results from last 7 days   Lab Units 05/01/22  1602   AST U/L 9   ALT U/L 40   ALK PHOS U/L 97   TOTAL PROTEIN g/dL 8 5*   ALBUMIN g/dL 3 8   TOTAL BILIRUBIN mg/dL 0 42     Results from last 7 days   Lab Units 05/02/22  0706 05/01/22  1634   POC GLUCOSE mg/dl 165* 296*     Results from last 7 days   Lab Units 05/02/22  0507 05/01/22  1602   GLUCOSE RANDOM mg/dL 189* 289*         Results from last 7 days   Lab Units 04/25/22  1439   HEMOGLOBIN A1C % 10 0*   EAG mg/dL 240*       Results from last 7 days   Lab Units 05/01/22  1602   PROTIME seconds 13 7   INR  1 10   PTT seconds 35     Results from last 7 days   Lab Units 05/01/22  1602   PROCALCITONIN ng/ml 0 41*     Results from last 7 days   Lab Units 05/01/22  1602   LACTIC ACID mmol/L 1 4     Results from last 7 days   Lab Units 05/01/22  1602   BLOOD CULTURE  Received in Microbiology Lab  Culture in Progress  Received in Microbiology Lab  Culture in Progress       ED Treatment:   Medication Administration from 05/01/2022 1317 to 05/01/2022 1948       Date/Time Order Dose Route Action     05/01/2022 1608 clindamycin (CLEOCIN) IVPB (premix in dextrose) 600 mg 50 mL 600 mg Intravenous New Bag     05/01/2022 1603 sodium chloride 0 9 % bolus 1,000 mL 1,000 mL Intravenous New Bag        Past Medical History:   Diagnosis Date    Class 1 obesity in adult     [facility]     Opioid abuse ([facility] )      Present on Admission:   Opioid abuse ([facility]   Diabetes mellitus ([facility] [facility] )      Admitting Diagnosis: Cellulitis [L03 90]  Leg swelling [M79 89]  Age/Sex: 37 y o  male  Admission Orders:  Scheduled Medications:  buprenorphine, 4 mg, Sublingual, BID  enoxaparin, 40 mg, Subcutaneous, Daily  gabapentin, 300 mg, Oral, TID  glipiZIDE, 10 mg, Oral, BID AC  insulin glargine, 30 Units, Subcutaneous, HS  insulin lispro, 2-12 Units, Subcutaneous, TID AC  losartan potassium-hydrochlorothiazide (HYZAAR 100/25) combo dose, , Oral, Daily  nicotine, 1 patch, Transdermal, Daily  piperacillin-tazobactam, 4 5 g, Intravenous, Q6H  vancomycin, 1,500 mg, Intravenous, Q8H      Continuous IV Infusions:  sodium chloride, 100 mL/hr, Intravenous, Continuous      PRN Meds:  acetaminophen, 650 mg, Oral, Q6H PRN  cloNIDine, 0 1 mg, Oral, Q8H PRN  dicyclomine, 10 mg, Oral, Q6H PRN  hydrOXYzine HCL, 25 mg, Oral, Q6H PRN  ibuprofen, 600 mg, Oral, Q6H PRN  loperamide, 4 mg, Oral, TID PRN  melatonin, 3 mg, Oral, HS PRN  ondansetron, 4 mg, Intravenous, Q6H PRN  traZODone, 50 mg, Oral, HS PRN    Fingerstick ac and hs       IP CONSULT TO PODIATRY  IP CONSULT TO INFECTIOUS DISEASES  IP CONSULT TO PHARMACY    Network Utilization Review Department  ATTENTION: Please call with any questions or concerns to [phone] and carefully listen to the prompts so that you are directed to the right person  All voicemails are confidential   [employer]eling all requests for admission clinical reviews, approved or denied determinations and any other requests to dedicated fax number below belonging to the campus where the patient is receiving treatment   List of dedicated fax numbers for the Facilities:  [address] DENIALS (Administrative/Medical Necessity) [phone]   [address] (Maternity/NICU/Pediatrics) [phone]   [address] [place] [address] [place] Arron Byers [place] [address] [address] [facility] Bridgette Garcia [address] T[address] [place] [phone] [phone]

## 2022-05-02 NOTE — PROGRESS NOTES
[address] [facility] Progress Note Theresa Melendez 1980, 37 y o  male MRN: [id number]  Unit/Bed#: -[place] Encounter: [id number]  Primary Care Provider: No primary care provider on file  Date and time admitted to hospital: 5/1/2022  3:03 PM    * Diabetic foot infection [facility])  Assessment & Plan  Lab Results   Component Value Date    HGBA1C 10 0 (H) 04/25/2022     He with progressive left lower extremity edema, warmth, erythema, with 1st/2nd toe ulceration with missed doses cephalexin and Bactrim while at [facility] detox center found with leukocytosis, elevated procalcitonin with CT lower extremity plantar ulcer  first toe interphalangeal joint with adjacent soft tissue swelling and increased vascularity, no abscess, foreign body or osteo  · Continue vancomycin and Zosyn pending ID consultation   · Follow-up blood culture  · Avoid opioids for pain control  · Continue ibuprofen p r n , acetaminophen p r n   · ID and Podiatry consults to be appreciated         Class 1 obesity due to excess calories in adult  Assessment & Plan  · Patient counseled on lifestyle modification with diet, exercise and weight loss    Diabetes mellitus (HCC)  Assessment & Plan  Lab Results   Component Value Date    HGBA1C 10 0 (H) 04/25/2022     · Uncontrolled a/e/b A1c  · Continue Lantus 30 units at bedtime, glipizide 10 mg b i d    · Will hold metformin   · Continue insulin sliding scale, Accu-Chek      Recent Labs     05/01/22  1634 05/02/22  0706   POCGLU 296* 165*       Blood Sugar Average: Last 72 hrs:  (P) 230 5    Hyponatremia  Assessment & Plan  · Pseudohyponatremia secondary to hyperglycemia  · Optimize glycemic control    Opioid abuse [facility])  Assessment & Plan  Patient checked into [facility] for fentanyl and methamphetamine detox last Wednesday  · No signs of withdrawal, monitor   · Conntinue current regimen buprenorphine, clonidine p r n , gabapentin tid  · Supportive care VTE Pharmacologic Prophylaxis: VTE Score: 5 High Risk (Score >/= 5) - Pharmacological DVT Prophylaxis Ordered: enoxaparin (Lovenox)  Sequential Compression Devices Ordered  Patient Centered Rounds: I performed bedside rounds with nursing staff today  Discussions with Specialists or Other Care Team Provider: podiatry re: consult; CM re: workup    Education and Discussions with Family / Patient: Patient declined call to   Time Spent for Care: 20 minutes  More than 50% of total time spent on counseling and coordination of care as described above  Current Length of Stay: 0 day(s)  Current Patient Status: Observation   Certification Statement: The patient, admitted on an observation basis, will now require > 2 midnight hospital stay due to pending further workup/evaluation  Discharge Plan: Anticipate discharge in 24-48 hrs to TBD home vs back to detox center    Code Status: Level 1 - Full Code    Subjective:   Patient seen this morning, he is feeling well  Denies any new issues overnight  No subjective fevers or chills  No chest pain or shortness of breath  Tolerating antibiotics well please awaiting consultations for further workup/recommendations  Objective:     Vitals:   Temp (24hrs), Av 5 °F (36 9 °C), Min:98 °F (36 7 °C), Max:100 2 °F (37 9 °C)    Temp:  [98 °F (36 7 °C)-100 2 °F (37 9 °C)] 98 3 °F (36 8 °C)  HR:  [] 83  Resp:  [18-20] 20  BP: (123-153)/(72-80) 123/80  SpO2:  [93 %-99 %] 93 %  Body mass index is 31 42 kg/m²  Input and Output Summary (last 24 hours): Intake/Output Summary (Last 24 hours) at 2022 0936  Last data filed at 2022 0701  Gross per 24 hour   Intake 1061 67 ml   Output 2 ml   Net 1059 67 ml       Physical Exam:   Physical Exam  Vitals and nursing note reviewed  Constitutional:       General: He is not in acute distress  Appearance: He is obese  He is not ill-appearing or toxic-appearing     Cardiovascular:      Rate and Rhythm: Normal rate and regular rhythm  Heart sounds: Normal heart sounds  Pulmonary:      Effort: Pulmonary effort is normal  No respiratory distress  Breath sounds: Normal breath sounds  No wheezing  Abdominal:      General: Bowel sounds are normal  There is no distension  Palpations: Abdomen is soft  Neurological:      Mental Status: He is alert and oriented to person, place, and time  Psychiatric:         Mood and Affect: Mood normal          Behavior: Behavior normal            Additional Data:     Labs:  Results from last 7 days   Lab Units 05/02/22  0507 05/01/22  1735 05/01/22  1735   WBC Thousand/uL 13 57*   < > 13 92*   HEMOGLOBIN g/dL 13 4   < > 14 0   HEMATOCRIT % 41 5   < > 42 2   PLATELETS Thousands/uL 348   < > 359   NEUTROS PCT %  --   --  65   LYMPHS PCT %  --   --  24   MONOS PCT %  --   --  8   EOS PCT %  --   --  2    < > = values in this interval not displayed  Results from last 7 days   Lab Units 05/02/22  0507 05/01/22  1602 05/01/22  1602   SODIUM mmol/L 136   < > 131*   POTASSIUM mmol/L 4 2   < > 4 2   CHLORIDE mmol/L 100   < > 94*   CO2 mmol/L 26   < > 27   BUN mg/dL 12   < > 15   CREATININE mg/dL 1 05   < > 1 21   ANION GAP mmol/L 10   < > 10   CALCIUM mg/dL 8 9   < > 9 2   ALBUMIN g/dL  --   --  3 8   TOTAL BILIRUBIN mg/dL  --   --  0 42   ALK PHOS U/L  --   --  97   ALT U/L  --   --  40   AST U/L  --   --  9   GLUCOSE RANDOM mg/dL 189*   < > 289*    < > = values in this interval not displayed       Results from last 7 days   Lab Units 05/01/22  1602   INR  1 10     Results from last 7 days   Lab Units 05/02/22  0706 05/01/22  1634   POC GLUCOSE mg/dl 165* 296*     Results from last 7 days   Lab Units 04/25/22  1439   HEMOGLOBIN A1C % 10 0*     Results from last 7 days   Lab Units 05/01/22  1602   LACTIC ACID mmol/L 1 4   PROCALCITONIN ng/ml 0 41*       Lines/Drains:  Invasive Devices  Report    Peripheral Intravenous Line            Peripheral IV 05/01/22 Right Forearm <1 day                      Imaging: No pertinent imaging reviewed  Recent Cultures (last 7 days):   Results from last 7 days   Lab Units 05/01/22  1602   BLOOD CULTURE  Received in Microbiology Lab  Culture in Progress  Received in Microbiology Lab  Culture in Progress  Last 24 Hours Medication List:   Current Facility-Administered Medications   Medication Dose Route Frequency Provider Last Rate    acetaminophen  650 mg Oral Q6H PRN Cinthia Jaquez MD      buprenorphine  4 mg Sublingual BID Cinthia Jaquez MD      cloNIDine  0 1 mg Oral Q8H PRN Cinthia Jaquez MD      dicyclomine  10 mg Oral Q6H PRN Cinthia Jaquez MD      enoxaparin  40 mg Subcutaneous Daily Cinthia Jaquez MD      gabapentin  300 mg Oral TID Cinthia Jaquez MD      glipiZIDE  10 mg Oral BID AC Cinthia Jaquez MD      hydrOXYzine HCL  25 mg Oral Q6H PRN Cinthia Jaquez MD      ibuprofen  600 mg Oral Q6H PRN Cinthia Jaquez MD      insulin glargine  30 Units Subcutaneous HS Cinthia Jaquez MD      insulin lispro  2-12 Units Subcutaneous TID AC Cinthia Jaquez MD      loperamide  4 mg Oral TID PRN Cinthia Jaquez MD      losartan potassium-hydrochlorothiazide (HYZAAR 100/25) combo dose   Oral Daily Cinthia Jaquez MD      melatonin  3 mg Oral HS PRN Cinthia Jaquez MD      nicotine  1 patch Transdermal Daily Cinthia Jaquez MD      ondansetron  4 mg Intravenous Q6H PRN Cinthia Jaquez MD      piperacillin-tazobactam  4 5 g Intravenous Q6H Cinthia Jaquez MD 4 5 g (05/02/22 0242)    sodium chloride  100 mL/hr Intravenous Continuous Cinthia Jaquez  mL/hr (05/02/22 8154)    traZODone  50 mg Oral HS PRN Cinthia Jaquez MD      vancomycin  1,500 mg Intravenous Q8H Cinthia Jaquez MD 1,500 mg (05/02/22 6827)        Today, Patient Was Seen By: Ammy Casas PA-C    **Please Note: This note may have been constructed using a voice recognition system  **

## 2022-05-02 NOTE — ASSESSMENT & PLAN NOTE
Lab Results   Component Value Date    HGBA1C 10 0 (H) 04/25/2022     · Uncontrolled a/e/b A1c  · Continue Lantus 30 units at bedtime, glipizide 10 mg b i d    · Will hold metformin   · Continue insulin sliding scale, Accu-Chek      Recent Labs     05/01/22  1634 05/02/22  0706   POCGLU 296* 165*       Blood Sugar Average: Last 72 hrs:  (P) 230 5

## 2022-05-02 NOTE — PROGRESS NOTES
Pt spotted coming back from elevator, had street clothes on  Pt stated to aid that he wanted to walk and left floor  Pt reminded that he cannot leave the floor  Charmaine Christianson PA-c with [employer] made aware  Will continue to monitor

## 2022-05-02 NOTE — DISCHARGE SUMMARY
[address] [facility]  Discharge- Krystal Rascon 1980, 37 y o  male MRN: [id number]  Unit/Bed#: -01 Encounter: [id number]  Primary Care Provider: No primary care provider on file  Date and time admitted to hospital: 5/1/2022  3:03 PM    * Diabetic foot infection [facility])  Assessment & Plan  Lab Results   Component Value Date    HGBA1C 10 0 (H) 04/25/2022     He with progressive left lower extremity edema, warmth, erythema, with 1st/2nd toe ulceration with missed doses cephalexin and Bactrim while at [facility] detox center found with leukocytosis, elevated procalcitonin with CT lower extremity plantar ulcer  first toe interphalangeal joint with adjacent soft tissue swelling and increased vascularity, no abscess, foreign body or osteo    Patient leaving AMA  Reports he would like to receive care at a hospital closer to home  Discussed the benefits of staying, and the risks of leaving; emphasizing that his infection requires continued IV antibiotic treatment  Offered alternative option for treatment and addressed reason for leaving, offering formal transfer to other facility and continued inpatient care  Patient verbalized understanding of the risks of leaving and reports he would like to leave "right now" and reports he will be going straight to the closer hospital      · Was receiving IV vancomycin and Zosyn; ID and podiatry consultation still pending  · Preliminary blood cultures negative   · Avoid opioids for pain control  · Continue ibuprofen p r n , acetaminophen p r n            Class 1 obesity due to excess calories in adult  Assessment & Plan  · Patient counseled on lifestyle modification with diet, exercise and weight loss    Diabetes mellitus (HCC)  Assessment & Plan  Lab Results   Component Value Date    HGBA1C 10 0 (H) 04/25/2022     · Uncontrolled a/e/b A1c  · Continue Lantus 30 units at bedtime, glipizide 10 mg b i d   · Metformin was held during inpatient stay, and correctional SSI was utilized       Recent Labs     05/01/22  1634 05/02/22  0706 05/02/22  1129 05/02/22  1603   POCGLU 296* 165* 245* 199*       Blood Sugar Average: Last 72 hrs:  (P) 226 25    Opioid abuse ([facility] [address] )  Assessment & Plan  Patient checked into [facility] for fentanyl and methamphetamine detox last Wednesday  · No signs of withdrawal, monitor   · Was on regimen of buprenorphine, clonidine p r n , gabapentin tid while hospitalized; patient leaving prior to next buprenorphine dose  · Supportive care   · Patient leaving [facility] to receive care at another facility closer to home    Hyponatremia  Assessment & Plan  · Pseudohyponatremia secondary to hyperglycemia  · Optimize glycemic control      Medical Problems             Resolved Problems  Date Reviewed: 5/2/2022    None              Discharging Physician / Practitioner: Eduardo Daugherty PA-C  PCP: No primary care provider on file  Admission Date:   Admission Orders (From admission, onward)     Ordered        05/01/22 1832  Place in Observation  Once                      Discharge Date: 05/02/22    Consultations During Hospital Stay:  · ID and podiatry (neither completed)     Procedures Performed:   · None     Significant Findings / Test Results:   · Diabetic foot infection     Incidental Findings:   · N/A      Test Results Pending at Discharge (will require follow up):   · N/A     Outpatient Tests Requested:  · Requested continued hospital stay     Complications:  Patient leaving [facility]     Reason for Admission: Diabetic foot infection     Hospital Course:   France Hui is a 37 y o  male patient who originally presented to the hospital on 5/1/2022 due to worsening diabetic foot infection  Patient failed outpatient PO antibiotics and was transferred from [facility] detox center for further care  Patient received IV antibiotics and was pending ID and podiatry consult   Patient leaving [facility] today reporting that he is going to an [place] [address] closer to home  Discussed the benefits of staying, and the rosks of leaving  Offered alternative option for treatment and addressed reason for leaving, offering formal transfer to other facility and continued inpatient care  Patient verbalized understanding of risks and would like to leave "right now;" stating he will be going straight to the other hospital         Please see above list of diagnoses and related plan for additional information  Condition at Discharge: poor    Discharge Day Visit / Exam:   * Please refer to separate progress note for these details *    Discussion with Family: Updated  (significant other) at bedside  Discharge instructions/Information to patient and family:   See after visit summary for information provided to patient and family  Provisions for Follow-Up Care:  See after visit summary for information related to follow-up care and any pertinent home health orders  Disposition:   Leaving AMA; reports he will be going to alternative hospital    Planned Readmission: Recommended continued hospital stay     Discharge Statement:  I spent 35 minutes discharging the patient  This time was spent on the day of discharge  I had direct contact with the patient on the day of discharge  Greater than 50% of the total time was spent examining patient, answering all patient questions, arranging and discussing plan of care with patient as well as directly providing post-discharge instructions  Additional time then spent on discharge activities  Discharge Medications:  See after visit summary for reconciled discharge medications provided to patient and/or family        **Please Note: This note may have been constructed using a voice recognition system**

## 2022-05-02 NOTE — NURSING NOTE
Patient requested to leave AMABraxton made aware and spoke with patient  He stated he wanted to go to a hospital closer to home  IV removed, and cici taken off  Patient left with significant other with his belongings

## 2022-05-02 NOTE — ASSESSMENT & PLAN NOTE
Lab Results   Component Value Date    HGBA1C 10 0 (H) 04/25/2022     He with progressive left lower extremity edema, warmth, erythema, with 1st/2nd toe ulceration with missed doses cephalexin and Bactrim while at [facility] detox center found with leukocytosis, elevated procalcitonin with CT lower extremity plantar ulcer  first toe interphalangeal joint with adjacent soft tissue swelling and increased vascularity, no abscess, foreign body or osteo  · Continue vancomycin and Zosyn pending ID consultation   · Follow-up blood culture  · Avoid opioids for pain control  · Continue ibuprofen p r n , acetaminophen p r n   · ID and Podiatry consults to be appreciated

## 2022-05-02 NOTE — SPEECH THERAPY NOTE
Speech-Language Pathology Bedside Swallow Evaluation        Patient Name: Selma Bates    MDQHL'U Date: 5/2/2022     Problem List  Principal Problem:    Diabetic foot infection ([facility] )  Active Problems:    Hyponatremia    Opioid abuse ([facility] )    Diabetes mellitus ([facility] )    Class 1 obesity due to excess calories in adult     Past Medical History  Past Medical History:   Diagnosis Date    Class 1 obesity in adult     Diabetes mellitus ([facility] )     Opioid abuse ([facility] )        Past Surgical History  Past Surgical History:   Procedure Laterality Date    NO PAST SURGERIES N/A        Summary/Impressions:   Bedside observations support grossly intact oropharyngeal swallow function across all consistencies tested  Self-fed solid and liquid trials with no s/s of dysphagia or distress  Patient denies difficulties or changes from baseline function  Recommendations:   Diet: regular diet and thin liquids; pt encouraged to choose softer options 2/2 dental status   Meds: whole with liquid   Feeding assistance: Independent   Frequent Oral care: 2-4x/day  Aspiration precautions and compensatory swallowing strategies: small bites/sips  Other Recommendations/ considerations: standard precautions        Current Medical Status  Pt is a 37 y o  male who presented to [facility] with past medical history diabetes presenting with left leg swelling  Patient states that he was seen on 04/21 at outside hospital and diagnosed with skin infection, placed on antibiotics and was told to return next day via phone call as there was concern for osteomyelitis on x-ray he would require CT  Patient did not stay for CT that day and states that he has been taking his Augmentin and Bactrim however when he 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 he was not given his antibiotics regularly  Per RN patient is edentulous and was placed on a dysphagia diet until formal SLP evaluation    At present patient denies difficulties with swallow function, states he rarely uses dentures and eats what he wants at baseline  Past medical history:  Please see H&P for details    Special Studies:  None pertinent to swallow/pulmonary/neuro function  Social/Education/Vocational Hx:  Pt lives with family    Swallow Information   Current Risks for Dysphagia & Aspiration: poor dental status  Current Symptoms/Concerns: none reported  Current Diet: mechanically altered/level 2 diet and nectar thick liquids   Baseline Diet: regular diet and thin liquids    Baseline Assessment   Behavior/Cognition: alert  Speech/Language Status: able to participate in conversation  Patient Positioning: upright in bed    Swallow Mechanism Exam   Facial: symmetrical  Labial: unable to test 2/2 limited command following  Lingual: unable to test 2/2 limited command following  Velum: unable to visualize  Mandible: unable to test 2/2 limited command following  Dentition: edentulous  Vocal quality:clear/adequate   Volitional Cough: unable to initiate volitional cough   Respiratory: RA  *Note, pt with limited willingness to participate in evaluation  Consistencies Assessed and Performance   Consistencies Administered: thin liquids and hard solids    Oral Stage: WFL  Patient presents with adequate bolus acceptance, containment and manipulation  Mastication judged to be prolonged, yet complete  No oral residue  Pharyngeal Stage: Appears functional   Laryngeal rise noted upon palpation  Swallow initiation appears timely  No overt s/s of aspiration or distress  Vocal quality remains clear and dry  Esophageal Concerns: none reported     Plan  No additional follow-up at this time  Please re-order should pt exhibit change in status or concerns arise       Results Reviewed with: patient, RN and PA     Zeeshan Cortés [address], [address] [facility]  Speech-Language Pathologist  [place] #[id number]  [place] #[id number]

## 2022-05-02 NOTE — ASSESSMENT & PLAN NOTE
Patient checked into [facility] for fentanyl and methamphetamine detox last Wednesday  -no signs of withdrawal  -continue current regimen buprenorphine, clonidine p r n , gabapentin tid  -supportive care

## 2022-05-02 NOTE — CASE MANAGEMENT
Case Management Assessment & Discharge Planning Note    Patient name Gorge Adalid  Location [address] [id number] MRN [id number]  : 1980 Date 2022       Current Admission Date: 2022  Current Admission Diagnosis:Diabetic foot infection [facility])   Patient Active Problem List    Diagnosis Date Noted    Hyponatremia 2022    Diabetic foot infection ([facility] ) 2022    Opioid abuse ([facility] ) 2022    Diabetes mellitus ([facility] ) 2022    Class 1 obesity due to excess calories in adult 2022      LOS (days): 0  Geometric Mean LOS (GMLOS) (days):   Days to GMLOS:     OBJECTIVE:              Current admission status: Observation       Preferred Pharmacy:   [address] [facility] [address] [address]  Phone: [phone] Fax: [phone]    Primary Care Provider: No primary care provider on file  Primary Insurance: [address] [place] [address]  Secondary Insurance:     ASSESSMENT:  Active Health Care Proxies    There are no active Health Care Proxies on file  Advance Directives  Does patient have a [address] [facility] [address] [facility] [address]?: No  Was patient offered paperwork?: Yes (Pt declined)  Does patient currently have a Health Care decision maker?: No (Pt declined to name a health care proxy stating that he can make his own decisions)  Does patient have Advance Directives?: No  Was patient offered paperwork?: Yes (Pt declined)  Primary Contact: Hansel Dotson/José Miguel other ([phone])    Readmission Root Cause  30 Day Readmission: No    Patient Information  Admitted from[de-identified] Home  Mental Status: Alert  During Assessment patient was accompanied by: Not accompanied during assessment  Assessment information provided by[de-identified] Patient  Primary Caregiver: Self  Support Systems: Spouse/significant other  [place] of Residence: [facility]  do you live in?: [address] [facility] entry access options   Select all that apply : Stairs  Number of steps to enter home : 2  Do the steps have railings?: Yes  Type of Current Residence: 2 story home  Upon entering residence, is there a bedroom on the main floor (no further steps)?: No  A bedroom is located on the following floor levels of residence (select all that apply):: 2nd Floor  Upon entering residence, is there a bathroom on the main floor (no further steps)?: Yes  Number of steps to 2nd floor from main floor: One Flight  In the last 12 months, was there a time when you were not able to pay the mortgage or rent on time?: No  In the last 12 months, how many places have you lived?: 1  In the last 12 months, was there a time when you did not have a steady place to sleep or slept in a shelter (including now)?: No  Homeless/housing insecurity resource given?: N/A  Living Arrangements: Lives w/ Spouse/significant other (Girlfriend and her two children)    Activities of Daily Living Prior to Admission  Functional Status: Independent  Completes ADLs independently?: Yes  Ambulates independently?: Yes  Does patient use assisted devices?: No  Does patient currently own DME?: No  Does patient have a history of Outpatient Therapy (PT/OT)?: Yes  Does the patient have a history of Short-Term Rehab?: No  Does patient have a history of HHC?: No  Does patient currently have [facility]?: No    Patient Information Continued  Income Source: Employed  Does patient have prescription coverage?: Yes  Within the past 12 months, you worried that your food would run out before you got the money to buy more : Never true  Within the past 12 months, the food you bought just didnt last and you didnt have money to get more : Never true  Food insecurity resource given?: N/A  Does patient receive dialysis treatments?: No  Does patient have a history of substance abuse?: Yes  Is patient currently in treatment for substance abuse?: Yes (Pt was recently at [facility] for detox  Pt reports seeing a MH and D/A counselor 1x per week   Pt declines any need for additional resources at this time )  Does patient have a history of Mental Health Diagnosis?: No    Means of Transportation  Means of Transport to Appts[de-identified] Drives Self  In the past 12 months, has lack of transportation kept you from medical appointments or from getting medications?: No  In the past 12 months, has lack of transportation kept you from meetings, work, or from getting things needed for daily living?: No  Was application for public transport provided?: N/A    DISCHARGE DETAILS:    Discharge planning discussed with[de-identified] Patient  Freedom of Choice: Yes  Comments - Freedom of Choice: Discussed freedom of choice as it relates to discharge planning based on treatment team recommendations  CM contacted family/caregiver?: No- see comments (Pt declined need to contact family at this time)  Were Treatment Team discharge recommendations reviewed with patient/caregiver?: Yes  Did patient/caregiver verbalize understanding of patient care needs?: Yes  Were patient/caregiver advised of the risks associated with not following Treatment Team discharge recommendations?: Yes    Contacts  Patient Contacts: Patient  Contact Method: In Person  Reason/Outcome: Continuity of [address]          Is the patient interested in [place] [address] at discharge?: No    DME Referral Provided  Referral made for DME?: No    Other Referral/Resources/Interventions Provided:  Interventions: None Indicated    Treatment Team Recommendation: Home  Discharge Destination Plan[de-identified] Home  Transport at Discharge : Family     Additional Comments: Pt reports being vaccinated for Covid but is not boosted

## 2022-05-02 NOTE — H&P
[facility] [facility]  H&PRosalind Alfredo 1980, 37 y o  male MRN: [id number]  Unit/Bed#: -[place] Encounter: [id number]  Primary Care Provider: No primary care provider on file  Date and time admitted to hospital: 5/1/2022  3:03 PM    * Diabetic foot infection [facility])  Assessment & Plan  Lab Results   Component Value Date    HGBA1C 10 0 (H) 04/25/2022     He with progressive left lower extremity edema, warmth, erythema, with 1st/2nd toe ulceration with missed doses cephalexin and Bactrim while at [facility] detox center found with leukocytosis, elevated procalcitonin with CT lower extremity plantar ulcer  first toe interphalangeal joint with adjacent soft tissue swelling and increased vascularity, no abscess, foreign body or osteo    Patient leaving AMA  Reports he would like to receive care at a hospital closer to home  Discussed the benefits of staying, and the risks of leaving  Offered alternative option for treatment and addressed reason for leaving, offering formal transfer to other facility and continued inpatient care  Patient reports he would like to leave "right now" and reports he will be going straight to the closer hospital      · Was receiving IV vancomycin and Zosyn; ID and podiatry consultation still pending  · Preliminary blood cultures negative   · Avoid opioids for pain control  · Continue ibuprofen p r n , acetaminophen p r n            Class 1 obesity due to excess calories in adult  Assessment & Plan  · Patient counseled on lifestyle modification with diet, exercise and weight loss    Diabetes mellitus (HCC)  Assessment & Plan  Lab Results   Component Value Date    HGBA1C 10 0 (H) 04/25/2022     · Uncontrolled a/e/b A1c  · Continue Lantus 30 units at bedtime, glipizide 10 mg b i d   · Metformin was held during inpatient stay, and correctional SSI was utilized       Recent Labs     05/01/22  1634 05/02/22  0706 05/02/22  1129 05/02/22  1603   POCGLU 296* 165* 245* 199*       Blood Sugar Average: Last 72 hrs:  (P) 226 25    Opioid abuse ([facility] )  Assessment & Plan  Patient checked into [facility] for fentanyl and methamphetamine detox last Wednesday  · No signs of withdrawal, monitor   · Was on regimen of buprenorphine, clonidine p r n , gabapentin tid while hospitalized; patient leaving prior to next buprenorphine dose  · Supportive care   · Patient leaving AM to receive care at another facility closer to home    Hyponatremia  Assessment & Plan  · Pseudohyponatremia secondary to hyperglycemia  · Optimize glycemic control    Medical Problems             Resolved Problems  Date Reviewed: 5/2/2022    None              Discharging Physician / Practitioner: Gita Bernal PA-C  PCP: No primary care provider on file  Admission Date:   Admission Orders (From admission, onward)     Ordered        05/01/22 1832  Place in Observation  Once                      Discharge Date: 05/02/22    Consultations During Hospital Stay:  · ID and podiatry (neither completed)    Procedures Performed:   · None    Significant Findings / Test Results:   · Diabetic foot infection    Incidental Findings:   · N/A     Test Results Pending at Discharge (will require follow up):   · N/A     Outpatient Tests Requested:  · Continued hospital stay    Complications:  Patient leaving [facility]    Reason for Admission: Diabetic foot infection    Hospital Course:   Dru Christopher is a 37 y o  male patient who originally presented to the hospital on 5/1/2022 due to worsening diabetic foot infection  Patient failed outpatient PO antibiotics and was transferred from [facility] detox center for further care  Patient received IV antibiotics and was pending ID and podiatry consult  Patient leaving [place] today reporting that he is going to an [place] [address] closer to home  Discussed the benefits of staying, and the rosks of leaving   Offered alternative option for treatment and addressed reason for leaving, offering formal transfer to other facility and continued inpatient care  Patient verbalized understanding of risks and would like to leave "right now;" stating he will be going straight to the other hospital          Please see above list of diagnoses and related plan for additional information  Condition at Discharge: poor    Discharge Day Visit / Exam:   * Please refer to separate progress note for these details *    Discussion with Family: Updated  (significant other) at bedside  Discharge instructions/Information to patient and family:   See after visit summary for information provided to patient and family  Provisions for Follow-Up Care:  See after visit summary for information related to follow-up care and any pertinent home health orders  Disposition:   Left AMA    Planned Readmission: Recommended continued hospital stay     Discharge Statement:  I spent 35 minutes discharging the patient  This time was spent on the day of discharge  I had direct contact with the patient on the day of discharge  Greater than 50% of the total time was spent examining patient, answering all patient questions, arranging and discussing plan of care with patient as well as directly providing post-discharge instructions  Additional time then spent on discharge activities  Discharge Medications:  See after visit summary for reconciled discharge medications provided to patient and/or family        **Please Note: This note may have been constructed using a voice recognition system**

## 2022-05-02 NOTE — ASSESSMENT & PLAN NOTE
Lab Results   Component Value Date    HGBA1C 10 0 (H) 04/25/2022     He with progressive left lower extremity edema, warmth, erythema, with 1st/2nd toe ulceration with missed doses cephalexin and Bactrim while at [facility] detox center found with leukocytosis, elevated procalcitonin with CT lower extremity plantar ulcer  first toe interphalangeal joint with adjacent soft tissue swelling and increased vascularity, no abscess, foreign body or osteo    Patient leaving AMA  Reports he would like to receive care at a hospital closer to home  Discussed the benefits of staying, and the risks of leaving; emphasizing that his infection requires continued IV antibiotic treatment  Offered alternative option for treatment and addressed reason for leaving, offering formal transfer to other facility and continued inpatient care  Patient verbalized understanding of the risks of leaving and reports he would like to leave "right now" and reports he will be going straight to the closer hospital      · Was receiving IV vancomycin and Zosyn; ID and podiatry consultation still pending  · Preliminary blood cultures negative   · Avoid opioids for pain control  · Continue ibuprofen p r n , acetaminophen p r n

## 2022-05-02 NOTE — PROGRESS NOTES
Vancomycin Assessment    Bella Del Valle is a 37 y o  male who is currently receiving vancomycin 1750mg IV q12h for skin-soft tissue infection     Relevant clinical data and objective history reviewed:  Creatinine   Date Value Ref Range Status   05/01/2022 1 21 0 60 - 1 30 mg/dL Final     Comment:     Standardized to IDMS reference method     /79   Pulse 97   Temp 100 2 °F (37 9 °C)   Resp 20   Ht 6' 5" (1 956 m)   Wt 120 kg (265 lb)   SpO2 94%   BMI 31 42 kg/m²   I/O last 3 completed shifts: In: 1061 7 [IV Piggyback:1061 7]  Out: -   Lab Results   Component Value Date/Time    BUN 15 05/01/2022 04:02 PM    WBC 13 92 (H) 05/01/2022 05:35 PM    HGB 14 0 05/01/2022 05:35 PM    HCT 42 2 05/01/2022 05:35 PM    MCV 89 05/01/2022 05:35 PM     05/01/2022 05:35 PM     Temp Readings from Last 3 Encounters:   05/01/22 100 2 °F (37 9 °C)     Vancomycin Days of Therapy: 1    Assessment/Plan  The patient is currently on vancomycin utilizing scheduled dosing based on adjusted body weight (due to obesity)  Baseline risks associated with therapy include: concomitant nephrotoxic medications  The patient is currently receiving 1750mg IV q12h and after clinical evaluation will be changed to 1500mg IV q8h  Pharmacy will also follow closely for s/sx of nephrotoxicity, infusion reactions, and appropriateness of therapy  BMP and CBC will be ordered per protocol  Plan for trough as patient approaches steady state, prior to the 4th  dose at approximately 2030 on 05/02/22  Due to infection severity, will target a trough of 15-20 (appropriate for most indications)   Pharmacy will continue to follow the patients culture results and clinical progress daily      Norah Meyer, Pharmacist

## 2022-05-02 NOTE — PLAN OF CARE
Problem: SAFETY ADULT  Goal: Patient will remain free of falls  Description: INTERVENTIONS:  - Educate patient/family on patient safety including physical limitations  - Instruct patient to call for assistance with activity   - Consult OT/PT to assist with strengthening/mobility   - Keep Call bell within reach  - Keep bed low and locked with side rails adjusted as appropriate  - Keep care items and personal belongings within reach  - Initiate and maintain comfort rounds  - Make Fall Risk Sign visible to staff  - Offer Toileting every 2 Hours, in advance of need  - Obtain necessary fall risk management equipment:   - Apply yellow socks and bracelet for high fall risk patients  - Consider moving patient to room near nurses station  Outcome: Progressing

## 2022-05-02 NOTE — PROGRESS NOTES
Vancomycin IV Pharmacy-to-Dose Consultation    Angela Coronado is a 37 y o  male who is currently receiving Vancomycin IV with management by the Pharmacy Consult service  Assessment/Plan:  The patient was reviewed  Renal function is stable and no signs or symptoms of nephrotoxicity and/or infusion reactions were documented in the chart  Based on todays assessment, continue current vancomycin (day # 2) dosing of 1500 mg IV q8h, with a plan for trough to be drawn at 2200 tonight 5-2-22  We will continue to follow the patients culture results and clinical progress daily      Jose David Rodriguez, Pharmacist

## 2022-05-02 NOTE — PLAN OF CARE
Problem: PAIN - ADULT  Goal: Verbalizes/displays adequate comfort level or baseline comfort level  Description: Interventions:  - Encourage patient to monitor pain and request assistance  - Assess pain using appropriate pain scale  - Administer analgesics based on type and severity of pain and evaluate response  - Implement non-pharmacological measures as appropriate and evaluate response  - Consider cultural and social influences on pain and pain management  - Notify physician/advanced practitioner if interventions unsuccessful or patient reports new pain  Outcome: Progressing     Problem: INFECTION - ADULT  Goal: Absence or prevention of progression during hospitalization  Description: INTERVENTIONS:  - Assess and monitor for signs and symptoms of infection  - Monitor lab/diagnostic results  - Monitor all insertion sites, i e  indwelling lines, tubes, and drains  - Monitor endotracheal if appropriate and nasal secretions for changes in amount and color  - [place] appropriate cooling/warming therapies per order  - Administer medications as ordered  - Instruct and encourage patient and family to use good hand hygiene technique  - Identify and instruct in appropriate isolation precautions for identified infection/condition  Outcome: Progressing  Goal: Absence of fever/infection during neutropenic period  Description: INTERVENTIONS:  - Monitor WBC    Outcome: Progressing     Problem: SAFETY ADULT  Goal: Patient will remain free of falls  Description: INTERVENTIONS:  - Educate patient/family on patient safety including physical limitations  - Instruct patient to call for assistance with activity   - Consult OT/PT to assist with strengthening/mobility   - Keep Call bell within reach  - Keep bed low and locked with side rails adjusted as appropriate  - Keep care items and personal belongings within reach  - Initiate and maintain comfort rounds  - Make Fall Risk Sign visible to staff  - Offer Toileting every  Hours, in advance of need  - Initiate/Maintain alarm  - Obtain necessary fall risk management equipment: - Apply yellow socks and bracelet for high fall risk patients  - Consider moving patient to room near nurses station  Outcome: Progressing  Goal: Maintain or return to baseline ADL function  Description: INTERVENTIONS:  -  Assess patient's ability to carry out ADLs; assess patient's baseline for ADL function and identify physical deficits which impact ability to perform ADLs (bathing, care of mouth/teeth, toileting, grooming, dressing, etc )  - Assess/evaluate cause of self-care deficits   - Assess range of motion  - Assess patient's mobility; develop plan if impaired  - Assess patient's need for assistive devices and provide as appropriate  - Encourage maximum independence but intervene and supervise when necessary  - Involve family in performance of ADLs  - Assess for home care needs following discharge   - Consider OT consult to assist with ADL evaluation and planning for discharge  - Provide patient education as appropriate  Outcome: Progressing  Goal: Maintains/Returns to pre admission functional level  Description: INTERVENTIONS:  - Perform BMAT or MOVE assessment daily    - Set and communicate daily mobility goal to care team and patient/family/caregiver  - Collaborate with rehabilitation services on mobility goals if consulted  - Perform Range of Motion  times a day  - Reposition patient every hours    - Dangle patient  times a day  - Stand patient  times a day  - Ambulate patient  times a day  - Out of bed to chair  times a day   - Out of bed for meals  times a day  - Out of bed for toileting  - Record patient progress and toleration of activity level   Outcome: Progressing     Problem: DISCHARGE PLANNING  Goal: Discharge to home or other facility with appropriate resources  Description: INTERVENTIONS:  - Identify barriers to discharge w/patient and caregiver  - Arrange for needed discharge resources and transportation as appropriate  - Identify discharge learning needs (meds, wound care, etc )  - Arrange for interpretive services to assist at discharge as needed  - Refer to Case Management Department for coordinating discharge planning if the patient needs post-hospital services based on physician/advanced practitioner order or complex needs related to functional status, cognitive ability, or social support system  Outcome: Progressing     Problem: Knowledge Deficit  Goal: Patient/family/caregiver demonstrates understanding of disease process, treatment plan, medications, and discharge instructions  Description: Complete learning assessment and assess knowledge base    Interventions:  - Provide teaching at level of understanding  - Provide teaching via preferred learning methods  Outcome: Progressing     Problem: SKIN/TISSUE INTEGRITY - ADULT  Goal: Skin Integrity remains intact(Skin Breakdown Prevention)  Description: Assess:  -Perform Sami assessment every  -Clean and moisturize skin every   -Inspect skin when repositioning, toileting, and assisting with ADLS  -Assess under medical devices such as  every   -Assess extremities for adequate circulation and sensation     Bed Management:  -Have minimal linens on bed & keep smooth, unwrinkled  -Change linens as needed when moist or perspiring  -Avoid sitting or lying in one position for more than  hours while in bed  -Keep HOB at degrees     Toileting:  -Offer bedside commode  -Assess for incontinence every   -Use incontinent care products after each incontinent episode such as     Activity:  -Mobilize patient  times a day  -Encourage activity and walks on unit  -Encourage or provide ROM exercises   -Turn and reposition patient every Hours  -Use appropriate equipment to lift or move patient in bed  -Instruct/ Assist with weight shifting every  when out of bed in chair  -Consider limitation of chair time  hour intervals    Skin Care:  -Avoid use of baby powder, tape, friction and shearing, hot water or constrictive clothing  -Relieve pressure over bony prominences using   -Do not massage red bony areas    Next Steps:  -Teach patient strategies to minimize risks such as   -Consider consults to  interdisciplinary teams such as   Outcome: Progressing  Goal: Incision(s), wounds(s) or drain site(s) healing without S/S of infection  Description: INTERVENTIONS  - Assess and document dressing, incision, wound bed, drain sites and surrounding tissue  - Provide patient and family education  - Perform skin care/dressing changes every   Outcome: Progressing  Goal: Pressure injury heals and does not worsen  Description: Interventions:  - Implement low air loss mattress or specialty surface (Criteria met)  - Apply silicone foam dressing  - Instruct/assist with weight shifting every  minutes when in chair   - Limit chair time to hour intervals  - Use special pressure reducing interventions such as  when in chair   - Apply fecal or urinary incontinence containment device   - Perform passive or active ROM every   - Turn and reposition patient & offload bony prominences every  hours   - Utilize friction reducing device or surface for transfers   - Consider consults to  interdisciplinary teams such as   - Use incontinent care products after each incontinent episode such as   - Consider nutrition services referral as needed  Outcome: Progressing     Problem: COPING  Goal: Pt/Family able to verbalize concerns and demonstrate effective coping strategies  Description: INTERVENTIONS:  - Assist patient/family to identify coping skills, available support systems and cultural and spiritual values  - Provide emotional support, including active listening and acknowledgement of concerns of patient and caregivers  - Reduce environmental stimuli, as able  - Provide patient education  - Assess for spiritual pain/suffering and initiate spiritual care, including notification of Pastoral Care or cristian based community as needed  - Assess effectiveness of coping strategies  Outcome: Progressing  Goal: Will report anxiety at manageable levels  Description: INTERVENTIONS:  - Administer medication as ordered  - Teach and encourage coping skills  - Provide emotional support  - Assess patient/family for anxiety and ability to cope  Outcome: Progressing     Problem: SUBSTANCE USE/ABUSE  Goal: Will have no detox symptoms and will verbalize plan for changing substance-related behavior  Description: INTERVENTIONS:  - Monitor physical status and assess for symptoms of withdrawal  - Administer medication as ordered  - Provide emotional support with 1 on 1 interaction with staff  - Encourage recovery focused program/ addiction education  - Assess for verbalization of changing behaviors related to substance abuse  - Initiate consults and referrals as appropriate (Case Management, Spiritual Care, etc )  Outcome: Progressing  Goal: By discharge, will develop insight into their chemical dependency and sustain motivation to continue in recovery  Description: INTERVENTIONS:  - Attends all daily group sessions and scheduled AA groups  - Actively practices coping skills through participation in the therapeutic community and adherence to program rules  - Reviews and completes assignments from individual treatment plan  - Assist patient development of understanding of their personal cycle of addiction and relapse triggers  Outcome: Progressing  Goal: By discharge, patient will have ongoing treatment plan addressing chemical dependency  Description: INTERVENTIONS:  - Assist patient with resources and/or appointments for ongoing recovery based living  Outcome: Progressing     Problem: Potential for Falls  Goal: Patient will remain free of falls  Description: INTERVENTIONS:  - Educate patient/family on patient safety including physical limitations  - Instruct patient to call for assistance with activity   - Consult OT/PT to assist with strengthening/mobility   - Keep Call bell within reach  - Keep bed low and locked with side rails adjusted as appropriate  - Keep care items and personal belongings within reach  - Initiate and maintain comfort rounds  - Make Fall Risk Sign visible to staff  - Offer Toileting every  Hours, in advance of need  - Initiate/Maintain alarm  - Obtain necessary fall risk management equipment:   - Apply yellow socks and bracelet for high fall risk patients  - Consider moving patient to room near nurses station  Outcome: Progressing

## 2022-05-02 NOTE — ASSESSMENT & PLAN NOTE
Lab Results   Component Value Date    HGBA1C 10 0 (H) 04/25/2022   Uncontrolled with A1c 10  Continue Lantus 30 units at bedtime, glipizide 10 mg b i d    Will hold metformin post IV contrast  On gentle IVF  Add insulin sliding scale, Accu-Chek      Recent Labs     05/01/22  1634   POCGLU 296*       Blood Sugar Average: Last 72 hrs:  (P) 296

## 2022-05-02 NOTE — ASSESSMENT & PLAN NOTE
Patient checked into [facility] for fentanyl and methamphetamine detox last Wednesday  · No signs of withdrawal, monitor   · Was on regimen of buprenorphine, clonidine p r n , gabapentin tid while hospitalized; patient leaving prior to next buprenorphine dose  · Supportive care   · Patient leaving AMA to receive care at another facility closer to home

## 2022-05-02 NOTE — ASSESSMENT & PLAN NOTE
Lab Results   Component Value Date    HGBA1C 10 0 (H) 04/25/2022     He with progressive left lower extremity edema, warmth, erythema, with ist/2nd toe ulceration with missed doses cephalexin and Bactrim while at [facility] detox center found with leukocytosis, elevated procalcitonin with CT lower extremity plantar ulcer  first toe interphalangeal joint with adjacent soft tissue swelling and increased vascularity, no abscess, foreign body or osteo  -will give vancomycin and Zosyn  -follow-up blood culture  -avoid opioids for pain control  -continue ibuprofen p r n , acetaminophen p r n   -ID and Podiatry consult  -consider MRI foot tomorrow

## 2022-05-07 LAB
BACTERIA BLD CULT: NORMAL
BACTERIA BLD CULT: NORMAL

## 2022-06-21 ENCOUNTER — DOCTOR'S OFFICE (OUTPATIENT)
Dept: URBAN - NONMETROPOLITAN AREA CLINIC 1 | Facility: CLINIC | Age: 42
Setting detail: OPHTHALMOLOGY
End: 2022-06-21
Payer: COMMERCIAL

## 2022-06-21 DIAGNOSIS — H52.13: ICD-10-CM

## 2022-06-21 DIAGNOSIS — E11.3293: ICD-10-CM

## 2022-06-21 PROCEDURE — 92134 CPTRZ OPH DX IMG PST SGM RTA: CPT | Performed by: OPTOMETRIST

## 2022-06-21 PROCEDURE — 92004 COMPRE OPH EXAM NEW PT 1/>: CPT | Performed by: OPTOMETRIST

## 2022-06-21 PROCEDURE — 92310 CONTACT LENS FITTING OU: CPT | Performed by: OPTOMETRIST

## 2022-06-21 ASSESSMENT — TONOMETRY
OS_IOP_MMHG: 14
OD_IOP_MMHG: 14

## 2022-06-21 ASSESSMENT — REFRACTION_MANIFEST
OD_VA2: 20/20
OD_SPHERE: -1.50
OD_VA1: 20/20
OS_SPHERE: -1.50
OD_CYLINDER: SPH
OS_CYLINDER: SPH
OS_VA2: 20/20
OS_VA1: 20/20

## 2022-06-21 ASSESSMENT — SPHEQUIV_DERIVED: OS_SPHEQUIV: -1.375

## 2022-06-21 ASSESSMENT — REFRACTION_AUTOREFRACTION
OS_AXIS: 161
OS_CYLINDER: -0.25
OS_SPHERE: -1.25

## 2022-06-21 ASSESSMENT — VISUAL ACUITY
OS_BCVA: 20/20-2
OD_BCVA: 20/50-2

## 2022-06-21 ASSESSMENT — CONFRONTATIONAL VISUAL FIELD TEST (CVF)
OS_FINDINGS: FULL
OD_FINDINGS: FULL

## 2023-03-15 ENCOUNTER — OPTICAL OFFICE (OUTPATIENT)
Dept: URBAN - NONMETROPOLITAN AREA CLINIC 4 | Facility: CLINIC | Age: 43
Setting detail: OPHTHALMOLOGY
End: 2023-03-15
Payer: COMMERCIAL

## 2023-03-15 DIAGNOSIS — H52.13: ICD-10-CM

## 2023-03-15 PROCEDURE — V2020 VISION SVCS FRAMES PURCHASES: HCPCS | Performed by: OPTOMETRIST

## 2023-03-15 PROCEDURE — V2784 LENS POLYCARB OR EQUAL: HCPCS | Performed by: OPTOMETRIST

## 2023-03-15 PROCEDURE — V2100 LENS SPHER SINGLE PLANO 4.00: HCPCS | Performed by: OPTOMETRIST

## 2023-06-23 ENCOUNTER — DOCTOR'S OFFICE (OUTPATIENT)
Dept: URBAN - NONMETROPOLITAN AREA CLINIC 1 | Facility: CLINIC | Age: 43
Setting detail: OPHTHALMOLOGY
End: 2023-06-23
Payer: COMMERCIAL

## 2023-06-23 ENCOUNTER — RX ONLY (RX ONLY)
Age: 43
End: 2023-06-23

## 2023-06-23 DIAGNOSIS — E11.3293: ICD-10-CM

## 2023-06-23 PROCEDURE — 92134 CPTRZ OPH DX IMG PST SGM RTA: CPT | Performed by: OPTOMETRIST

## 2023-06-23 PROCEDURE — 92014 COMPRE OPH EXAM EST PT 1/>: CPT | Performed by: OPTOMETRIST

## 2023-06-23 ASSESSMENT — REFRACTION_MANIFEST
OD_CYLINDER: -0.25
OD_VA1: 20/20
OD_AXIS: 075
OS_CYLINDER: SPH
OD_SPHERE: -1.50
OS_VA1: 20/20
OS_SPHERE: -1.50

## 2023-06-23 ASSESSMENT — REFRACTION_CURRENTRX
OD_AXIS: 180
OD_SPHERE: -1.50
OS_OVR_VA: 20/
OS_SPHERE: -1.50
OS_CYLINDER: 0.00
OD_OVR_VA: 20/
OD_CYLINDER: 0.00
OS_AXIS: 180

## 2023-06-23 ASSESSMENT — SPHEQUIV_DERIVED
OS_SPHEQUIV: -2.305
OD_SPHEQUIV: -1.68
OD_SPHEQUIV: -1.625

## 2023-06-23 ASSESSMENT — REFRACTION_AUTOREFRACTION
OS_AXIS: 007
OS_SPHERE: -2.12
OD_AXIS: 078
OD_SPHERE: -1.62
OS_CYLINDER: -0.37
OD_CYLINDER: -0.12

## 2023-06-23 ASSESSMENT — VISUAL ACUITY
OS_BCVA: 20/20-1
OD_BCVA: 20/20-1

## 2023-06-23 ASSESSMENT — CONFRONTATIONAL VISUAL FIELD TEST (CVF)
OS_FINDINGS: FULL
OD_FINDINGS: FULL

## 2023-06-23 ASSESSMENT — TONOMETRY
OS_IOP_MMHG: 14
OD_IOP_MMHG: 14

## 2024-03-25 ENCOUNTER — OPTICAL OFFICE (OUTPATIENT)
Dept: URBAN - NONMETROPOLITAN AREA CLINIC 4 | Facility: CLINIC | Age: 44
Setting detail: OPHTHALMOLOGY
End: 2024-03-25

## 2024-03-25 DIAGNOSIS — H52.13: ICD-10-CM

## 2024-03-25 PROCEDURE — S0500 DISPOS CONT LENS: HCPCS | Mod: RT | Performed by: OPTOMETRIST

## 2024-03-25 PROCEDURE — S0500 DISPOS CONT LENS: HCPCS | Mod: LT | Performed by: OPTOMETRIST

## 2025-05-13 ENCOUNTER — OFFICE VISIT (OUTPATIENT)
Dept: WOUND CARE | Facility: CLINIC | Age: 45
End: 2025-05-13
Payer: COMMERCIAL

## 2025-05-13 VITALS
RESPIRATION RATE: 18 BRPM | SYSTOLIC BLOOD PRESSURE: 134 MMHG | DIASTOLIC BLOOD PRESSURE: 78 MMHG | HEIGHT: 77 IN | WEIGHT: 280 LBS | HEART RATE: 110 BPM | BODY MASS INDEX: 33.06 KG/M2 | TEMPERATURE: 96.8 F

## 2025-05-13 DIAGNOSIS — L97.529 DIABETIC ULCER OF LEFT GREAT TOE (HCC): ICD-10-CM

## 2025-05-13 DIAGNOSIS — E11.621 DIABETIC ULCER OF LEFT GREAT TOE (HCC): ICD-10-CM

## 2025-05-13 DIAGNOSIS — L97.519 DIABETIC ULCER OF RIGHT GREAT TOE (HCC): Primary | ICD-10-CM

## 2025-05-13 DIAGNOSIS — E11.621 DIABETIC ULCER OF RIGHT GREAT TOE (HCC): Primary | ICD-10-CM

## 2025-05-13 PROCEDURE — 97597 DBRDMT OPN WND 1ST 20 CM/<: CPT | Performed by: PODIATRIST

## 2025-05-13 PROCEDURE — 99213 OFFICE O/P EST LOW 20 MIN: CPT | Performed by: PODIATRIST

## 2025-05-13 PROCEDURE — 99204 OFFICE O/P NEW MOD 45 MIN: CPT | Performed by: PODIATRIST

## 2025-05-13 RX ORDER — LIDOCAINE 40 MG/G
CREAM TOPICAL ONCE
Status: COMPLETED | OUTPATIENT
Start: 2025-05-13 | End: 2025-05-13

## 2025-05-13 RX ORDER — CLONIDINE HYDROCHLORIDE 0.3 MG/1
0.3 TABLET ORAL 2 TIMES DAILY
COMMUNITY

## 2025-05-13 RX ORDER — PANTOPRAZOLE SODIUM 40 MG/1
TABLET, DELAYED RELEASE ORAL
COMMUNITY

## 2025-05-13 RX ORDER — INSULIN LISPRO 100 [IU]/ML
INJECTION, SOLUTION INTRAVENOUS; SUBCUTANEOUS
COMMUNITY
Start: 2025-03-20

## 2025-05-13 RX ORDER — BUPRENORPHINE 300 MG/1
SOLUTION SUBCUTANEOUS
COMMUNITY

## 2025-05-13 RX ORDER — GABAPENTIN 400 MG/1
CAPSULE ORAL
COMMUNITY

## 2025-05-13 RX ORDER — ATORVASTATIN CALCIUM 10 MG/1
TABLET, FILM COATED ORAL
COMMUNITY

## 2025-05-13 RX ORDER — INSULIN GLARGINE 100 [IU]/ML
70 INJECTION, SOLUTION SUBCUTANEOUS DAILY
COMMUNITY

## 2025-05-13 RX ORDER — EMPAGLIFLOZIN 25 MG/1
TABLET, FILM COATED ORAL
COMMUNITY

## 2025-05-13 RX ORDER — LISINOPRIL 20 MG/1
20 TABLET ORAL
COMMUNITY
Start: 2025-04-08

## 2025-05-13 RX ADMIN — LIDOCAINE: 40 CREAM TOPICAL at 13:30

## 2025-05-13 NOTE — PATIENT INSTRUCTIONS
Orders Placed This Encounter   Procedures    Wound cleansing and dressings     Right and Left Great Toes       Wash your hands with soap and water.  Remove old dressing, discard into plastic bag and place in trash.  Cleanse the wound with mild soap and water prior to applying a clean dressing. Do not use tissue or cotton balls. Do not scrub the wound. Pat dry using gauze.  Shower no   May purchase cast covers.      Apply puracol ag to the bilateral great toe wounds.  Cover with gauze.  Secure with tape.  Change dressing every other day      Continue to wear off-loading devices  Off-loading Instructions: Surgical Shoes with off-loading pads    Keep weight and pressure off wound at all times. Try to walk with pressure on the heels.  Wear off-loading device as directed by your physician. Put on immediately when rising in the morning and remove when going to bed.      Please try to consume 3-4 servings of protein (30g) each day.   - Each serving of protein should contain about 30 mg protein.   - Good sources of protein include, lean meats (fish, chicken, etc), eggs, dairy products (yogurt, cheese), tofu, legumes (chickpeas, lentils, peas, black beans), nuts (cashew, walnut, peanuts, etc), & quinoa.       If you develop fever, chills, nausea or vomiting, increase in drainage or foul smelling drainage go to the closest emergency department for evaluation.      Please obtain imaging studies as ordered   Complete antibiotic therapy      Follow up in 2 weeks     Standing Status:   Future     Expected Date:   5/20/2025     Expiration Date:   5/20/2025    Wound Procedure Treatment     This order was created via procedure documentation

## 2025-05-13 NOTE — PROGRESS NOTES
Wound Procedure Treatment    Performed by: Michaela Hernández RN  Authorized by: Anjali Titus DPM  Associated wounds:   Wound 05/13/25 Diabetic Ulcer Toe D1, great Right  Wound 05/13/25 Diabetic Ulcer Toe D1, great Left    Wound cleansed with:  NSS   Applied primary dressing:  Silver and Collagen dressing   Applied secondary dressing:  Gauze   Dressing secured with:  Tape and Surgilast   Offloading device appllied:  Surgical shoe

## 2025-05-13 NOTE — PROGRESS NOTES
Patient ID: Beau Daly is a 44 y.o. male Date of Birth 1980       Chief Complaint   Patient presents with    New Patient Visit     Bilateral great toe diabetic ulcers approx 2 years with healing off and on        Allergies  Patient has no known allergies.    Diagnosis:  1. Diabetic ulcer of right great toe (HCC)  -     Wound cleansing and dressings; Future; Expected date: 05/20/2025  -     lidocaine (LMX) 4 % cream  -     VAS ARTERIAL DUPLEX- LOWER LIMB BILATERAL; Future; Expected date: 05/13/2025  2. Diabetic ulcer of left great toe (HCC)  -     Wound cleansing and dressings; Future; Expected date: 05/20/2025  -     lidocaine (LMX) 4 % cream  -     VAS ARTERIAL DUPLEX- LOWER LIMB BILATERAL; Future; Expected date: 05/13/2025      Diagnosis ICD-10-CM Associated Orders   1. Diabetic ulcer of right great toe (HCC)  E11.621 Wound cleansing and dressings    L97.519 lidocaine (LMX) 4 % cream     VAS ARTERIAL DUPLEX- LOWER LIMB BILATERAL      2. Diabetic ulcer of left great toe (HCC)  E11.621 Wound cleansing and dressings    L97.529 lidocaine (LMX) 4 % cream     VAS ARTERIAL DUPLEX- LOWER LIMB BILATERAL             Assessment & Plan:    -Chronic bilateral plantar great toe diabetic wounds.  Patient states wounds have reoccurred multiple times over the last 2 years  -A1c 8% 4/3/2025  - Currently seeing wound care specialist at [facility], Dr. Hahn.  Presents for second opinion  -[facility] 05/13/25 Wound Center right 0.94, left 0.91  - Rx noninvasive vascular studies  - Hospital course reviewed admission at [facility] 4/30/2025.  Left lower extremity cellulitis.  Discharged on cefadroxil 500 mg twice daily.  Ciprofloxacin 500 mg twice daily.  Per hospital notes general surgery stated patient required only local wound care and no surgical intervention.   -MRI left foot 4/5/2025 there is abnormal marrow signal involving the distal phalanx of   the great toe. There is mild marrow enhancement consistent with  osteomyelitis.    -CT left 4/30/2025 no evidence of acute fracture or OM.  Evidence of old AVN and mild fragmentation of left third metatarsal head.  Mild degenerative arthritis noted.  Evidence of small soft tissue ulcer in left great toe with associated left great toe cellulitis and no evidence of drainable soft tissue abscess or collection   -XR left foot no OM, no MAXIMINO  -CT 4/3/2025 negative for osteomyelitis  - 5/8/2025 wound care note reviewed dr. Hahn.  Antibiotics prescribed ciprofloxacin 500 mg twice daily for 14 days, cefadroxil 500 mg twice daily for 14 days  - Continue antibiotics as prescribed continue antibiotics as prescribed  -Smoking cessation reviewed  - Patient counseled on the need for continued strict glycemic control  - Wound chronic, patient counseled on the need for further offloading.  Due to hallux rigidus of bilateral first MPJs patient is likely overloading plantar aspect of bilateral great toes.  - Surgical shoes further offloaded with felt padding to prevent pressure on areas of wounds  - Patient counseled that he is at a high risk for further infection and osteomyelitis.  This may result in need for surgical intervention such as amputation  - Patient counseled on worsening signs of infection and will present to the ED if these occur  - Follow-up 2 weeks for reevaluation      Subjective:   HPI  44-year-old male with PMH DM2, HTN, anxiety, history of opioid abuse, tobacco use, obesity who presents with bilateral great toe diabetic ulcerations.  Patient states they have been present for approximately 2 years.  They developed after wearing a new pair of steel toed shoes.  Patient has been seeing a wound care specialist at [facility] without significant improvement.  Patient states he was recently hospitalized about a month ago for left foot cellulitis from his left great toe wound.  Patient states imaging was concerning for osteomyelitis however his general surgeon/wound  care specialist recommended local wound care and antibiotic management.  Patient has been following up with his wound care specialist since discharge.  Patient was advised to seek second opinion for further evaluation and management of bilateral great toes.  Patient states that he was recently seen on 5/8/2025 and represcribed antibiotics.  He denies any significant redness, swelling, or pain at this time in his left great toe.  Patient denies any nausea, vomiting, fever, chills, shortness of breath      The following portions of the patient's history were reviewed and updated as appropriate:   Patient Active Problem List   Diagnosis    Hyponatremia    Diabetic foot infection  (HCC)    Opioid abuse (HCC)    Diabetes mellitus (HCC)    Class 1 obesity due to excess calories in adult     Past Medical History:   Diagnosis Date    Class 1 obesity in adult     Diabetes mellitus (HCC)     Opioid abuse (HCC)      Past Surgical History:   Procedure Laterality Date    NO PAST SURGERIES N/A      Social History     Socioeconomic History    Marital status: Unknown     Spouse name: Not on file    Number of children: Not on file    Years of education: Not on file    Highest education level: Not on file   Occupational History    Not on file   Tobacco Use    Smoking status: Every Day    Smokeless tobacco: Never   Substance and Sexual Activity    Alcohol use: Not Currently    Drug use: Not Currently     Types: Fentanyl    Sexual activity: Not on file   Other Topics Concern    Not on file   Social History Narrative    Not on file     Social Drivers of Health     Financial Resource Strain: Not At Risk (4/3/2025)    Received from [facility]    Financial Insecurity     In the last 12 months did you skip medications to save money?: No     In the last 12 months was there a time when you needed to see a doctor but could not because of cost?: No   Food Insecurity: No Food Insecurity (4/3/2025)    Received from [facility]    Food Insecurity     In the last 12 months did you ever eat less than you felt you should because there wasn't enough money for food?: No   Transportation Needs: No Transportation Needs (4/3/2025)    Received from [facility]    Transportation Needs     In the last 12 months have you ever had to go without healthcare because you didn't have a way to get there?: No   Physical Activity: Not on file   Stress: Not on file   Social Connections: Socially Integrated (4/3/2025)    Received from [facility]    Social Connection     Do you often feel lonely?: No   Intimate Partner Violence: Not on file   Housing Stability: Not At Risk (4/3/2025)    Received from [facility]    Housing Stability     Are you worried that in the next 2 months you may not have stable housing?: No        Current Outpatient Medications:     atorvastatin (LIPITOR) 10 mg tablet, Take by mouth, Disp: , Rfl:     cloNIDine (CATAPRES) 0.3 mg tablet, Take 0.3 mg by mouth 2 (two) times a day, Disp: , Rfl:     Empagliflozin (JARDIANCE) 10 MG TABS tablet, Take 25 mg by mouth daily, Disp: , Rfl:     gabapentin (NEURONTIN) 400 mg capsule, Take by mouth, Disp: , Rfl:     insulin lispro (HumaLOG) 100 units/mL injection pen, Inject under the skin, Disp: , Rfl:     Jardiance 25 MG TABS, Take by mouth, Disp: , Rfl:     Lantus SoloStar 100 units/mL SOPN, Inject 70 Units under the skin daily, Disp: , Rfl:     lisinopril (ZESTRIL) 20 mg tablet, Take 20 mg by mouth, Disp: , Rfl:     metFORMIN (GLUCOPHAGE) 1000 MG tablet, Take by mouth, Disp: , Rfl:     pantoprazole (PROTONIX) 40 mg tablet, Take by mouth, Disp: , Rfl:     Sublocade 300 MG/1.5ML, Inject under the skin, Disp: , Rfl:   No current facility-administered medications for this visit.  Family History   Problem Relation Age of Onset    Diabetes Mother       Review of Systems   Constitutional:  Negative for chills and fever.   Respiratory:   "Negative for chest tightness and shortness of breath.    Cardiovascular:  Negative for leg swelling.   Musculoskeletal:  Positive for arthralgias.   Skin:  Positive for wound.       Objective:  /78   Pulse (!) 110   Temp (!) 96.8 °F (36 °C)   Resp 18   Ht 6' 5\" (1.956 m)   Wt 127 kg (280 lb)   BMI 33.20 kg/m²     Physical Exam  Constitutional:       General: He is not in acute distress.     Appearance: He is not ill-appearing.   Cardiovascular:      Comments: DP pulse 2/4, PT pulse nonpalpable  CRT to distal digits less than 3 seconds  Skin temperature gradient decreased from knees to digits bilaterally  Absent pedal hair growth  Hemosiderin deposits noted to bilateral lower extremities.  No significant edema noted  Musculoskeletal:      Comments: Mild pes planus bilaterally  Hallux rigidus noted to bilateral first MPJs with palpable bony prominences dorsal medial aspect of bilateral MPJs  MMT 5/5 at level of ankle.  No pain with palpation of posterior calves   Skin:     Capillary Refill: Capillary refill takes less than 2 seconds.      Comments: Left great toe plantar IPJ diabetic ulceration with primarily granular base and hyperkeratotic periwound.  No drainage expressed.  No appreciable edema or erythema.  No crepitus, no fluctuance    Right great toe plantar medial IPJ ulceration with granular base.  Hyperkeratotic periwound.  No surrounding erythema or edema, no crepitus, no fluctuance, no malodor   Neurological:      Comments: Moderate decrease in gross sensation, decrease in protective sensation             Wound 05/13/25 Diabetic Ulcer Toe D1, great Right (Active)   Wound Image   05/13/25 1305   Enter Castillo score: Castillo Grade 1: Partial or full-thickness ulcer (superficial) 05/13/25 1307   Wound Description Pink 05/13/25 1307   Non-staged Wound Description Full thickness 05/13/25 1307   Wound Length (cm) 0.1 cm 05/13/25 1307   Wound Width (cm) 0.6 cm 05/13/25 1307   Wound Depth (cm) 0.3 cm " "05/13/25 1307   Wound Surface Area (cm^2) 0.06 cm^2 05/13/25 1307   Wound Volume (cm^3) 0.018 cm^3 05/13/25 1307   Calculated Wound Volume (cm^3) 0.02 cm^3 05/13/25 1307   Number of underminings 1 05/13/25 1307   Undermining 1 0.3 05/13/25 1307   Undermining 1 is depth extending from 12-6; deepest at 6 05/13/25 1307   Drainage Amount Small 05/13/25 1307   Drainage Description Serosanguineous 05/13/25 1307   Viola-wound Assessment Callus 05/13/25 1307       Wound 05/13/25 Diabetic Ulcer Toe D1, great Left (Active)   Wound Image   05/13/25 1306   Enter Castillo score: Castillo Grade 1: Partial or full-thickness ulcer (superficial) 05/13/25 1308   Wound Description Pink 05/13/25 1308   Non-staged Wound Description Full thickness 05/13/25 1308   Wound Length (cm) 0.4 cm 05/13/25 1308   Wound Width (cm) 0.4 cm 05/13/25 1308   Wound Depth (cm) 0.3 cm 05/13/25 1308   Wound Surface Area (cm^2) 0.16 cm^2 05/13/25 1308   Wound Volume (cm^3) 0.048 cm^3 05/13/25 1308   Calculated Wound Volume (cm^3) 0.05 cm^3 05/13/25 1308   Drainage Amount Small 05/13/25 1308   Drainage Description Serosanguineous 05/13/25 1308   Viola-wound Assessment Callus 05/13/25 1308                             Debridement   Wound 05/13/25 Diabetic Ulcer Toe D1, great Right     Date/Time: 5/13/2025 1:00 PM    Universal Protocol:  procedure performed by consultantConsent: Verbal consent obtained.  Risks and benefits: risks, benefits and alternatives were discussed  Consent given by: patient  Time out: Immediately prior to procedure a \"time out\" was called to verify the correct patient, procedure, equipment, support staff and site/side marked as required.  Patient understanding: patient states understanding of the procedure being performed  Patient identity confirmed: verbally with patient    Debridement Details  Performed by: physician  Debridement type: selective  Pain control: lidocaine 4%    Post-debridement measurements  Length (cm): 0.1  Width (cm): " "0.6  Depth (cm): 0.3  Percent debrided: 100%  Surface Area (cm^2): 0.06  Area Debrided (cm^2): 0.06  Volume (cm^3): 0.02    Devitalized tissue debrided: callus and fibrin  Instrument(s) utilized: curette  Technique utilized: nonexcisional  Bleeding: none  Hemostasis obtained with: not applicable  Procedural pain (0-10): 0  Post-procedural pain: 0   Response to treatment: procedure was tolerated well    Debridement   Wound 05/13/25 Diabetic Ulcer Toe D1, great Left     Date/Time: 5/13/2025 1:00 PM    Universal Protocol:  procedure performed by consultantConsent: Verbal consent obtained.  Risks and benefits: risks, benefits and alternatives were discussed  Consent given by: patient  Time out: Immediately prior to procedure a \"time out\" was called to verify the correct patient, procedure, equipment, support staff and site/side marked as required.  Patient understanding: patient states understanding of the procedure being performed  Patient identity confirmed: verbally with patient    Debridement Details  Performed by: physician  Debridement type: selective  Pain control: lidocaine 4%    Post-debridement measurements  Length (cm): 0.4  Width (cm): 0.4  Depth (cm): 0.3  Percent debrided: 100%  Surface Area (cm^2): 0.16  Area Debrided (cm^2): 0.16  Volume (cm^3): 0.05    Devitalized tissue debrided: callus and fibrin  Instrument(s) utilized: curette  Technique utilized: nonexcisional  Bleeding: none  Hemostasis obtained with: not applicable  Procedural pain (0-10): 0  Post-procedural pain: 0   Response to treatment: procedure was tolerated well               Wound Instructions:  Orders Placed This Encounter   Procedures    Wound cleansing and dressings     Right and Left Great Toes       Wash your hands with soap and water.  Remove old dressing, discard into plastic bag and place in trash.  Cleanse the wound with mild soap and water prior to applying a clean dressing. Do not use tissue or cotton balls. Do not scrub the " "wound. Pat dry using gauze.  Shower no   May purchase cast covers.      Apply puracol ag to the bilateral great toe wounds.  Cover with gauze.  Secure with tape.  Change dressing every other day      Continue to wear off-loading devices  Off-loading Instructions: Surgical Shoes with off-loading pads    Keep weight and pressure off wound at all times. Try to walk with pressure on the heels.  Wear off-loading device as directed by your physician. Put on immediately when rising in the morning and remove when going to bed.      Please try to consume 3-4 servings of protein (30g) each day.   - Each serving of protein should contain about 30 mg protein.   - Good sources of protein include, lean meats (fish, chicken, etc), eggs, dairy products (yogurt, cheese), tofu, legumes (chickpeas, lentils, peas, black beans), nuts (cashew, walnut, peanuts, etc), & quinoa.       If you develop fever, chills, nausea or vomiting, increase in drainage or foul smelling drainage go to the closest emergency department for evaluation.      Please obtain imaging studies as ordered   Complete antibiotic therapy      Follow up in 2 weeks     Standing Status:   Future     Expected Date:   5/20/2025     Expiration Date:   5/20/2025    Wound Procedure Treatment     This order was created via procedure documentation         Anjali Titus DPM    Portions of the record may have been created with voice recognition software. Occasional wrong word or \"sound a like\" substitutions may have occurred due to the inherent limitations of voice recognition software. Read the chart carefully and recognize, using context, where substitutions have occurred.     "

## 2025-05-27 ENCOUNTER — OFFICE VISIT (OUTPATIENT)
Dept: WOUND CARE | Facility: CLINIC | Age: 45
End: 2025-05-27
Payer: COMMERCIAL

## 2025-05-27 VITALS
RESPIRATION RATE: 18 BRPM | DIASTOLIC BLOOD PRESSURE: 76 MMHG | HEART RATE: 88 BPM | TEMPERATURE: 96.1 F | SYSTOLIC BLOOD PRESSURE: 138 MMHG

## 2025-05-27 DIAGNOSIS — E11.621 DIABETIC ULCER OF RIGHT GREAT TOE (HCC): Primary | ICD-10-CM

## 2025-05-27 DIAGNOSIS — L97.519 DIABETIC ULCER OF RIGHT GREAT TOE (HCC): Primary | ICD-10-CM

## 2025-05-27 DIAGNOSIS — E11.621 DIABETIC ULCER OF LEFT GREAT TOE (HCC): ICD-10-CM

## 2025-05-27 DIAGNOSIS — L97.529 DIABETIC ULCER OF LEFT GREAT TOE (HCC): ICD-10-CM

## 2025-05-27 PROCEDURE — 11042 DBRDMT SUBQ TIS 1ST 20SQCM/<: CPT | Performed by: PODIATRIST

## 2025-05-27 RX ORDER — LIDOCAINE HYDROCHLORIDE 40 MG/ML
5 SOLUTION TOPICAL ONCE
Status: COMPLETED | OUTPATIENT
Start: 2025-05-27 | End: 2025-05-27

## 2025-05-27 RX ADMIN — LIDOCAINE HYDROCHLORIDE 5 ML: 40 SOLUTION TOPICAL at 09:51

## 2025-05-27 NOTE — PATIENT INSTRUCTIONS
Orders Placed This Encounter   Procedures    Wound cleansing and dressings     Right and Left Great Toes         Wash your hands with soap and water.  Remove old dressing, discard into plastic bag and place in trash.  Cleanse the wound with mild soap and water prior to applying a clean dressing. Do not use tissue or cotton balls. Do not scrub the wound. Pat dry using gauze.  Shower no   May purchase cast covers.        Apply puracol ag to the bilateral great toe wounds.  Cover with gauze.  Secure with tape.  Change dressing every other day        Continue to wear off-loading devices  Off-loading Instructions: Surgical Shoes with off-loading pads     Keep weight and pressure off wound at all times. Try to walk with pressure on the heels.  Wear off-loading device as directed by your physician. Put on immediately when rising in the morning and remove when going to bed.        Please try to consume 3-4 servings of protein (30g) each day.   - Each serving of protein should contain about 30 mg protein.   - Good sources of protein include, lean meats (fish, chicken, etc), eggs, dairy products (yogurt, cheese), tofu, legumes (chickpeas, lentils, peas, black beans), nuts (cashew, walnut, peanuts, etc), & quinoa.        If you develop fever, chills, nausea or vomiting, increase in drainage or foul smelling drainage go to the closest emergency department for evaluation.       Please obtain imaging studies as ordered   Complete antibiotic therapy     Follow up in 2 weeks     Standing Status:   Future     Expected Date:   5/27/2025     Expiration Date:   6/3/2025

## 2025-05-27 NOTE — PROGRESS NOTES
Patient ID: Beau Daly is a 44 y.o. male Date of Birth 1980       Chief Complaint   Patient presents with    Follow Up Wound Care Visit     Diabetic ulcer bilateral great toes       Allergies:  Patient has no known allergies.    Diagnosis:  1. Diabetic ulcer of right great toe (HCC)  -     Wound cleansing and dressings; Future; Expected date: 05/27/2025  -     lidocaine (XYLOCAINE) 4 % topical solution 5 mL  2. Diabetic ulcer of left great toe (HCC)  -     Wound cleansing and dressings; Future; Expected date: 05/27/2025  -     lidocaine (XYLOCAINE) 4 % topical solution 5 mL     Diagnosis ICD-10-CM Associated Orders   1. Diabetic ulcer of right great toe (HCC)  E11.621 Wound cleansing and dressings    L97.519 lidocaine (XYLOCAINE) 4 % topical solution 5 mL      2. Diabetic ulcer of left great toe (HCC)  E11.621 Wound cleansing and dressings    L97.529 lidocaine (XYLOCAINE) 4 % topical solution 5 mL           Assessment & Plan:  See wound orders.  Puracol    -LLE GT plantar diabetic ulceration similar in size undermining noted, surgical debridement performed.  No SOI.  -RLE GT plantar diabetic ulceration.  Undermining noted, surgical debridement performed.  No SOI  -Continue with current wound care  -Vas arterial studies pending, scheduled for tomorrow  -Reviewed possible TCC if vascular studies are appropriate and patient can tolerate  -Reviewed possible return to work if work is able to accommodate for seated activity  -Smoking cessation reviewed  - Patient counseled on the need for continued strict glycemic control  - Wound chronic, patient counseled on the need for further offloading.  Due to hallux rigidus of bilateral first MPJs patient is likely overloading plantar aspect of bilateral great toes.  - Surgical shoes further offloaded with felt padding to prevent pressure on areas of wounds  - Patient counseled that he is at a high risk for further infection and osteomyelitis.  This may result in need  for surgical intervention such as amputation  - Patient counseled on worsening signs of infection and will present to the ED if these occur  - Follow-up 1 week      From prior visit:  -Chronic bilateral plantar great toe diabetic wounds.  Patient states wounds have reoccurred multiple times over the last 2 years  -A1c 8% 4/3/2025  - Currently seeing wound care specialist at [facility], Dr. Hahn.  Presents for second opinion  -IRWIN 05/13/25 Wound Center right 0.94, left 0.91  - Hospital course reviewed admission at [facility] 4/30/2025.  Left lower extremity cellulitis.  Discharged on cefadroxil 500 mg twice daily.  Ciprofloxacin 500 mg twice daily.  Per hospital notes general surgery stated patient required only local wound care and no surgical intervention.              -MRI left foot 4/5/2025 there is abnormal marrow signal involving the distal phalanx of   the great toe. There is mild marrow enhancement consistent with osteomyelitis.               -CT left 4/30/2025 no evidence of acute fracture or OM.  Evidence of old AVN and mild fragmentation of left third metatarsal head.  Mild degenerative arthritis noted.  Evidence of small soft tissue ulcer in left great toe with associated left great toe cellulitis and no evidence of drainable soft tissue abscess or collection              -XR left foot no OM, no MAXIMINO  -CT 4/3/2025 negative for osteomyelitis  - 5/8/2025 wound care note reviewed dr. Hahn.  Antibiotics prescribed ciprofloxacin 500 mg twice daily for 14 days, cefadroxil 500 mg twice daily for 14 days  - Antibiotics complete    Subjective:   HPI    5/27/25 for continued valuation management of chronic bilateral plantar great toe wounds.  Patient states he has been trying to wear surgical shoes as directed.  He denies any redness, swelling, or drainage.  He completed and tolerated antibiotics as prescribed by his mother per wound care provider without incidence.  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  Patient denies any systemic or local signs of infection and has been adherent to dressing changes    5/13/2025 44-year-old male with PMH DM2, HTN, anxiety, history of opioid abuse, tobacco use, obesity who presents with bilateral great toe diabetic ulcerations.  Patient states they have been present for approximately 2 years.  They developed after wearing a new pair of steel toed shoes.  Patient has been seeing a wound care specialist at [facility] without significant improvement.  Patient states he was recently hospitalized about a month ago for left foot cellulitis from his left great toe wound.  Patient states imaging was concerning for osteomyelitis however his general surgeon/wound care specialist recommended local wound care and antibiotic management.  Patient has been following up with his wound care specialist since discharge.  Patient was advised to seek second opinion for further evaluation and management of bilateral great toes.  Patient states that he was recently seen on 5/8/2025 and represcribed antibiotics.  He denies any significant redness, swelling, or pain at this time in his left great toe.  Patient denies any nausea, vomiting, fever, chills, shortness of breath     The following portions of the patient's history were reviewed and updated as appropriate:   Problem List[1]  Past Medical History[2]  Past Surgical History[3]  Social History[4]   Current Medications[5]  Family History[6]   Review of Systems  Constitutional:  Negative for chills and fever.   Respiratory:  Negative for chest tightness and shortness of breath.    Cardiovascular:  Negative for leg swelling.   Musculoskeletal:  Positive for arthralgias.   Skin:  Positive for wound.        Objective:  /76   Pulse 88   Temp (!) 96.1 °F (35.6 °C)   Resp 18     Physical Exam    Constitutional:       General: He is not in acute distress.      Appearance: He is not ill-appearing.   Cardiovascular:      Comments: DP pulse 2/4, PT pulse nonpalpable  CRT to distal digits less than 3 seconds  Skin temperature gradient decreased from knees to digits bilaterally  Absent pedal hair growth  Hemosiderin deposits noted to bilateral lower extremities.  No significant edema noted  Musculoskeletal:      Comments: Mild pes planus bilaterally  Hallux rigidus noted to bilateral first MPJs with palpable bony prominences dorsal medial aspect of bilateral MPJs  MMT 5/5 at level of ankle.  No pain with palpation of posterior calves   Skin:     Capillary Refill: Capillary refill takes less than 2 seconds.      Comments: Left great toe plantar IPJ diabetic ulceration with primarily granular base and hyperkeratotic periwound.  No drainage expressed.  No appreciable edema or erythema.  No crepitus, no fluctuance     Right great toe plantar medial IPJ ulceration with granular base.  Hyperkeratotic periwound.  No surrounding erythema or edema, no crepitus, no fluctuance, no malodor   Neurological:      Comments: Moderate decrease in gross sensation, decrease in protective sensation   Wound 05/13/25 Diabetic Ulcer Toe D1, great Right (Active)   Wound Image   05/27/25 0853   Enter Castillo score: Castillo Grade 1: Partial or full-thickness ulcer (superficial) 05/27/25 0854   Wound Description Pink 05/27/25 0854   Non-staged Wound Description Full thickness 05/27/25 0854   Wound Length (cm) 0.1 cm 05/27/25 0854   Wound Width (cm) 0.5 cm 05/27/25 0854   Wound Depth (cm) 0.5 cm 05/27/25 0854   Wound Surface Area (cm^2) 0.04 cm^2 05/27/25 0854   Wound Volume (cm^3) 0.013 cm^3 05/27/25 0854   Calculated Wound Volume (cm^3) 0.03 cm^3 05/27/25 0854   Change in Wound Size % -50 05/27/25 0854   Number of underminings 1 05/13/25 1307   Undermining 1 0.3 05/13/25 1307   Undermining 1 is depth extending from 12-6; deepest at 6 05/13/25 1307   Drainage Amount Small 05/27/25 0854   Drainage  "Description Serosanguineous 05/27/25 0854   Viola-wound Assessment Callus 05/27/25 0854       Wound 05/13/25 Diabetic Ulcer Toe D1, great Left (Active)   Wound Image   05/27/25 0853   Enter Castillo score: Castillo Grade 1: Partial or full-thickness ulcer (superficial) 05/27/25 0855   Wound Description Pink;Yellow 05/27/25 0855   Non-staged Wound Description Full thickness 05/27/25 0855   Wound Length (cm) 0.3 cm 05/27/25 0855   Wound Width (cm) 0.3 cm 05/27/25 0855   Wound Depth (cm) 0.4 cm 05/27/25 0855   Wound Surface Area (cm^2) 0.07 cm^2 05/27/25 0855   Wound Volume (cm^3) 0.019 cm^3 05/27/25 0855   Calculated Wound Volume (cm^3) 0.04 cm^3 05/27/25 0855   Change in Wound Size % 20 05/27/25 0855   Number of underminings 1 05/27/25 0855   Undermining 1 0.4 05/27/25 0855   Undermining 1 is depth extending from 0.4 05/27/25 0855   Drainage Amount Small 05/27/25 0855   Drainage Description Serosanguineous 05/27/25 0855   Viola-wound Assessment Callus 05/27/25 0855       [REMOVED] Wound 05/01/22 Diabetic Ulcer Toe (Comment  which one) Anterior;Right (Removed)       [REMOVED] Wound 05/01/22 Diabetic Ulcer Toe (Comment  which one) Anterior;Left (Removed)                       Debridement   Wound 05/13/25 Diabetic Ulcer Toe D1, great Left     Date/Time: 5/27/2025 8:30 AM    Universal Protocol:  procedure performed by consultantConsent: Verbal consent obtained  Risks and benefits: risks, benefits and alternatives were discussed  Consent given by: patient  Time out: Immediately prior to procedure a \"time out\" was called to verify the correct patient, procedure, equipment, support staff and site/side marked as required.  Patient understanding: patient states understanding of the procedure being performed  Patient identity confirmed: verbally with patient    Debridement Details  Performed by: physician  Debridement type: surgical  Level of debridement: subcutaneous tissue  Pain control: lidocaine 4%    Post-debridement " "measurements  Length (cm): 0.5  Width (cm): 0.6  Depth (cm): 0.4  Percent debrided: 100%  Surface Area (cm^2): 0.24  Area Debrided (cm^2): 0.24  Volume (cm^3): 0.06    Tissue and other material debrided: dermis, epidermis and subcutaneous tissue  Devitalized tissue debrided: callus, fibrin and slough  Instrument(s) utilized: curette and forceps  Technique utilized: nonexcisional  Bleeding: small  Hemostasis obtained with: pressure  Procedural pain (0-10): 0  Post-procedural pain: 0   Response to treatment: procedure was tolerated well    Debridement     Date/Time: 5/27/2025 8:30 AM    Universal Protocol:  procedure performed by consultantConsent: Verbal consent obtained  Risks and benefits: risks, benefits and alternatives were discussed  Consent given by: patient  Time out: Immediately prior to procedure a \"time out\" was called to verify the correct patient, procedure, equipment, support staff and site/side marked as required.  Patient understanding: patient states understanding of the procedure being performed  Patient identity confirmed: verbally with patient    Debridement Details  Performed by: physician  Debridement type: surgical  Level of debridement: subcutaneous tissue  Pain control: lidocaine 4%    Post-debridement measurements  Length (cm): 0.3  Width (cm): 0.6  Depth (cm): 0.5  Percent debrided: 100%  Surface Area (cm^2): 0.14  Area Debrided (cm^2): 0.14  Volume (cm^3): 0.05    Tissue and other material debrided: dermis, epidermis and subcutaneous tissue  Devitalized tissue debrided: callus, fibrin and slough  Instrument(s) utilized: curette and forceps  Technique utilized: excisional  Bleeding: small  Hemostasis obtained with: pressure  Procedural pain (0-10): 0  Post-procedural pain: 0   Response to treatment: procedure was tolerated well                 Wound Instructions:  Orders Placed This Encounter   Procedures    Wound cleansing and dressings     Right and Left Great Toes         Wash your hands " "with soap and water.  Remove old dressing, discard into plastic bag and place in trash.  Cleanse the wound with mild soap and water prior to applying a clean dressing. Do not use tissue or cotton balls. Do not scrub the wound. Pat dry using gauze.  Shower no   May purchase cast covers.        Apply puracol ag to the bilateral great toe wounds.  Cover with gauze.  Secure with tape.  Change dressing every other day        Continue to wear off-loading devices  Off-loading Instructions: Surgical Shoes with off-loading pads     Keep weight and pressure off wound at all times. Try to walk with pressure on the heels.  Wear off-loading device as directed by your physician. Put on immediately when rising in the morning and remove when going to bed.        Please try to consume 3-4 servings of protein (30g) each day.   - Each serving of protein should contain about 30 mg protein.   - Good sources of protein include, lean meats (fish, chicken, etc), eggs, dairy products (yogurt, cheese), tofu, legumes (chickpeas, lentils, peas, black beans), nuts (cashew, walnut, peanuts, etc), & quinoa.        If you develop fever, chills, nausea or vomiting, increase in drainage or foul smelling drainage go to the closest emergency department for evaluation.       Please obtain imaging studies as ordered   Complete antibiotic therapy     Follow up in 2 weeks     Standing Status:   Future     Expected Date:   5/27/2025     Expiration Date:   6/3/2025    Debridement     This order was created via procedure documentation    Debridement     This order was created via procedure documentation         Anjali Titus DPM      Portions of the record may have been created with voice recognition software. Occasional wrong word or \"sound a like\" substitutions may have occurred due to the inherent limitations of voice recognition software. Read the chart carefully and recognize, using context, where substitutions have occurred.            [1] "   Patient Active Problem List  Diagnosis    Hyponatremia    Diabetic foot infection  (HCC)    Opioid abuse (HCC)    Diabetes mellitus (HCC)    Class 1 obesity due to excess calories in adult   [2]   Past Medical History:  Diagnosis Date    Class 1 obesity in adult     Diabetes mellitus (HCC)     Opioid abuse (HCC)    [3]   Past Surgical History:  Procedure Laterality Date    NO PAST SURGERIES N/A    [4]   Social History  Socioeconomic History    Marital status: Unknown   Tobacco Use    Smoking status: Every Day    Smokeless tobacco: Never   Substance and Sexual Activity    Alcohol use: Not Currently    Drug use: Not Currently     Types: Fentanyl     Social Drivers of Health     Financial Resource Strain: Not At Risk (4/3/2025)    Received from [facility]    Financial Insecurity     In the last 12 months did you skip medications to save money?: No     In the last 12 months was there a time when you needed to see a doctor but could not because of cost?: No   Food Insecurity: No Food Insecurity (4/3/2025)    Received from [facility]    Food Insecurity     In the last 12 months did you ever eat less than you felt you should because there wasn't enough money for food?: No   Transportation Needs: No Transportation Needs (4/3/2025)    Received from [facility]    Transportation Needs     In the last 12 months have you ever had to go without healthcare because you didn't have a way to get there?: No   Social Connections: Socially Integrated (4/3/2025)    Received from [facility]    Social Connection     Do you often feel lonely?: No   Housing Stability: Not At Risk (4/3/2025)    Received from [facility]    Housing Stability     Are you worried that in the next 2 months you may not have stable housing?: No   [5]   Current Outpatient Medications:     atorvastatin (LIPITOR) 10 mg tablet, Take by mouth, Disp: , Rfl:     cloNIDine (CATAPRES)  0.3 mg tablet, Take 0.3 mg by mouth 2 (two) times a day, Disp: , Rfl:     Empagliflozin (JARDIANCE) 10 MG TABS tablet, Take 25 mg by mouth daily, Disp: , Rfl:     gabapentin (NEURONTIN) 400 mg capsule, Take by mouth, Disp: , Rfl:     insulin lispro (HumaLOG) 100 units/mL injection pen, Inject under the skin, Disp: , Rfl:     Jardiance 25 MG TABS, Take by mouth, Disp: , Rfl:     Lantus SoloStar 100 units/mL SOPN, Inject 70 Units under the skin daily, Disp: , Rfl:     lisinopril (ZESTRIL) 20 mg tablet, Take 20 mg by mouth, Disp: , Rfl:     metFORMIN (GLUCOPHAGE) 1000 MG tablet, Take by mouth, Disp: , Rfl:     pantoprazole (PROTONIX) 40 mg tablet, Take by mouth, Disp: , Rfl:     Sublocade 300 MG/1.5ML, Inject under the skin, Disp: , Rfl:   No current facility-administered medications for this visit.  [6]   Family History  Problem Relation Name Age of Onset    Diabetes Mother

## 2025-05-28 ENCOUNTER — HOSPITAL ENCOUNTER (OUTPATIENT)
Dept: NON INVASIVE DIAGNOSTICS | Facility: HOSPITAL | Age: 45
Discharge: HOME | End: 2025-05-28
Attending: PODIATRIST
Payer: COMMERCIAL

## 2025-05-28 DIAGNOSIS — L97.519 DIABETIC ULCER OF RIGHT GREAT TOE (HCC): ICD-10-CM

## 2025-05-28 DIAGNOSIS — E11.621 DIABETIC ULCER OF LEFT GREAT TOE (HCC): ICD-10-CM

## 2025-05-28 DIAGNOSIS — L97.529 DIABETIC ULCER OF LEFT GREAT TOE (HCC): ICD-10-CM

## 2025-05-28 DIAGNOSIS — E11.621 DIABETIC ULCER OF RIGHT GREAT TOE (HCC): ICD-10-CM

## 2025-05-28 PROCEDURE — 93923 UPR/LXTR ART STDY 3+ LVLS: CPT

## 2025-05-28 PROCEDURE — 93925 LOWER EXTREMITY STUDY: CPT

## 2025-05-29 PROCEDURE — 93925 LOWER EXTREMITY STUDY: CPT | Performed by: SURGERY

## 2025-05-29 PROCEDURE — 93922 UPR/L XTREMITY ART 2 LEVELS: CPT | Performed by: SURGERY

## 2025-06-03 ENCOUNTER — OFFICE VISIT (OUTPATIENT)
Dept: WOUND CARE | Facility: CLINIC | Age: 45
End: 2025-06-03
Payer: COMMERCIAL

## 2025-06-03 VITALS
SYSTOLIC BLOOD PRESSURE: 148 MMHG | HEART RATE: 88 BPM | TEMPERATURE: 96.1 F | DIASTOLIC BLOOD PRESSURE: 76 MMHG | RESPIRATION RATE: 20 BRPM

## 2025-06-03 DIAGNOSIS — L97.529 DIABETIC ULCER OF LEFT GREAT TOE (HCC): ICD-10-CM

## 2025-06-03 DIAGNOSIS — E11.621 DIABETIC ULCER OF RIGHT GREAT TOE (HCC): Primary | ICD-10-CM

## 2025-06-03 DIAGNOSIS — E11.69 TYPE 2 DIABETES MELLITUS WITH OTHER SPECIFIED COMPLICATION, UNSPECIFIED WHETHER LONG TERM INSULIN USE (HCC): ICD-10-CM

## 2025-06-03 DIAGNOSIS — E11.621 DIABETIC ULCER OF LEFT GREAT TOE (HCC): ICD-10-CM

## 2025-06-03 DIAGNOSIS — L97.519 DIABETIC ULCER OF RIGHT GREAT TOE (HCC): Primary | ICD-10-CM

## 2025-06-03 PROCEDURE — 97597 DBRDMT OPN WND 1ST 20 CM/<: CPT | Performed by: PODIATRIST

## 2025-06-03 PROCEDURE — 29445 APPL RIGID TOT CNTC LEG CAST: CPT | Performed by: PODIATRIST

## 2025-06-03 RX ORDER — LIDOCAINE 40 MG/G
CREAM TOPICAL ONCE
Status: COMPLETED | OUTPATIENT
Start: 2025-06-03 | End: 2025-06-03

## 2025-06-03 RX ADMIN — LIDOCAINE: 40 CREAM TOPICAL at 13:36

## 2025-06-03 NOTE — PROGRESS NOTES
Wound Procedure Treatment Left Toe D1, great    Performed by: Michaela Reyes RN  Authorized by: Anjali Titus DPM  Associated wounds:   Wound 05/13/25 Diabetic Ulcer Toe D1, great Left    Wound cleansed with:  NSS   Applied primary dressing:  Acticoat and Silicone bordered foam   Offloading device appllied:  TCC   Comments:  Acticoat 3 , TCC applied by Dr. Titus

## 2025-06-03 NOTE — PROGRESS NOTES
"Debridement     Date/Time: 6/3/2025 1:15 PM    Universal Protocol:  procedure performed by consultantConsent: Verbal consent obtained  Risks and benefits: risks, benefits and alternatives were discussed  Consent given by: patient  Time out: Immediately prior to procedure a \"time out\" was called to verify the correct patient, procedure, equipment, support staff and site/side marked as required.  Patient understanding: patient states understanding of the procedure being performed  Patient identity confirmed: verbally with patient    Debridement Details  Performed by: physician  Debridement type: selective  Pain control: lidocaine 4%    Post-debridement measurements  Length (cm): 0.3  Width (cm): 1  Depth (cm): 0.6  Percent debrided: 100%  Surface Area (cm^2): 0.24  Area Debrided (cm^2): 0.24  Volume (cm^3): 0.09    Devitalized tissue debrided: callus and slough  Instrument(s) utilized: curette  Technique utilized: nonexcisional  Bleeding: none  Hemostasis obtained with: not applicable  Procedural pain (0-10): 0  Post-procedural pain: 0   Response to treatment: procedure was tolerated well        "

## 2025-06-03 NOTE — PROGRESS NOTES
Patient ID: Beau Daly is a 44 y.o. male Date of Birth 1980       Chief Complaint   Patient presents with    Follow Up Wound Care Visit       Allergies:  Patient has no known allergies.    Diagnosis:  1. Diabetic ulcer of right great toe (HCC)  -     Wound cleansing and dressings Right Toe D1, great; Future  -     lidocaine (LMX) 4 % cream  -     Wound cleansing and dressings Left Toe D1, great; Future  -     Debridement  -     Wound Procedure Treatment Left Toe D1, great  -     Wound Procedure Treatment Right Toe D1, great  2. Diabetic ulcer of left great toe (HCC)  -     Wound cleansing and dressings Right Toe D1, great; Future  -     lidocaine (LMX) 4 % cream  -     Wound cleansing and dressings Left Toe D1, great; Future  -     Cast Application  -     Wound Procedure Treatment Left Toe D1, great  -     Wound Procedure Treatment Right Toe D1, great  3. Type 2 diabetes mellitus with other specified complication, unspecified whether long term insulin use (HCC)  -     Wound cleansing and dressings Right Toe D1, great; Future  -     lidocaine (LMX) 4 % cream  -     Wound cleansing and dressings Left Toe D1, great; Future  -     Wound Procedure Treatment Left Toe D1, great  -     Wound Procedure Treatment Right Toe D1, great     Diagnosis ICD-10-CM Associated Orders   1. Diabetic ulcer of right great toe (HCC)  E11.621 Wound cleansing and dressings Right Toe D1, great    L97.519 lidocaine (LMX) 4 % cream     Wound cleansing and dressings Left Toe D1, great     Debridement     Wound Procedure Treatment Left Toe D1, great     Wound Procedure Treatment Right Toe D1, great      2. Diabetic ulcer of left great toe (HCC)  E11.621 Wound cleansing and dressings Right Toe D1, great    L97.529 lidocaine (LMX) 4 % cream     Wound cleansing and dressings Left Toe D1, great     Cast Application     Wound Procedure Treatment Left Toe D1, great     Wound Procedure Treatment Right Toe D1, great      3. Type 2 diabetes  mellitus with other specified complication, unspecified whether long term insulin use (HCC)  E11.69 Wound cleansing and dressings Right Toe D1, great     lidocaine (LMX) 4 % cream     Wound cleansing and dressings Left Toe D1, great     Wound Procedure Treatment Left Toe D1, great     Wound Procedure Treatment Right Toe D1, great           Assessment & Plan:  See wound orders.    Puracol     -LLE GT plantar diabetic ulceration.  Stable with hyperkeratotic periwound.  No debridement performed  -RLE GT plantar diabetic ulceration.  Stable, hyperkeratotic periwound.  Selective debridement performed.  Continue with current wound care and offloading surgical shoe  -Discussed left lower extremity treatment with TCC.  Risks and benefits of total contact casting reviewed with patient  -TCC was applied to the patients left  foot and lower leg.  The purpose of the TCC is to remove pressure from the foot ulcer upon ambulation and heal the diabetic ulcers. Prior to placing the TCC, consent was obtained and the patient was placed in a position to access the lower leg and foot.  The patient tolerated the TCC application well and was instructed in its use.  They are to call with any concerns about swelling or pain.  Present to the ED for removal of TCC if concerns arise  -Once your wound is healed, you will need to be fitted for proper footwear to ensure that the wound does not return. You may be casted until this process is completed. It is important to avoid old shoes and habits that may have led to the development of your wound.   -Arterial studies reviewed LEADs 5/27/25   CONCLUSION:  RIGHT LOWER LIMB:   There is no evidence of significant lower extremity arterial occlusive disease.   Ankle/Brachial Index: 1.20 which is in the normal range.   PVR/ PPG tracings are normal.   Metatarsal pressure: 122 mmHg   Great toe pressure: 87 mmHg, which is predictive of healing.   LEFT LOWER LIMB:   There is no evidence of significant lower  extremity arterial occlusive disease.   Ankle/Brachial Index: 1.14 which is in the normal range.   PVR/ PPG tracings are normal.   Metatarsal pressure: 136 mmHg   Great toe pressure: 100 mmHg, which is predictive of healing.  -Reviewed possible TCC if vascular studies are appropriate and patient can tolerate  -Smoking cessation reviewed  - Patient counseled on the need for continued strict glycemic control  -DM2 A1c 8.0%/30/25.  Patient counseled on the need for strict glycemic control to assist with wound healing  - Patient counseled that he is at a high risk for further infection and osteomyelitis.  This may result in need for surgical intervention such as amputation  - Patient counseled on worsening signs of infection and will present to the ED if these occur  - Follow-up Thursday for removal of TCC and evaluation        From prior visit:  -Chronic bilateral plantar great toe diabetic wounds.  Patient states wounds have reoccurred multiple times over the last 2 years  -A1c 8% 4/3/2025  - Currently seeing wound care specialist at [facility], Dr. Hahn.  Presents for second opinion  -[facility] 05/13/25 Wound Center right 0.94, left 0.91  - Hospital course reviewed admission at [facility] 4/30/2025.  Left lower extremity cellulitis.  Discharged on cefadroxil 500 mg twice daily.  Ciprofloxacin 500 mg twice daily.  Per hospital notes general surgery stated patient required only local wound care and no surgical intervention.              -MRI left foot 4/5/2025 there is abnormal marrow signal involving the distal phalanx of   the great toe. There is mild marrow enhancement suspicious for osteomyelitis.               -CT left 4/30/2025 no evidence of acute fracture or OM.  Evidence of old AVN and mild fragmentation of left third metatarsal head.  Mild degenerative arthritis noted.  Evidence of small soft tissue ulcer in left great toe with associated left great toe cellulitis and no evidence of drainable soft tissue abscess  or collection              -XR left foot no OM, no MAXIMINO  -CT 4/3/2025 negative for osteomyelitis  - 5/8/2025 wound care note reviewed dr. Hahn.  Antibiotics prescribed ciprofloxacin 500 mg twice daily for 14 days, cefadroxil 500 mg twice daily for 14 days  - Antibiotics complete    Subjective:   HPI    6/3/2025 bilateral plantar great toe wounds.  Patient completed vascular studies.  Patient has been wearing offloading surgical shoes as directed.  He denies any significant change since last exam.  He denies any local or systemic signs of infection.    5/27/25 for continued valuation management of chronic bilateral plantar great toe wounds.  Patient states he has been trying to wear surgical shoes as directed.  He denies any redness, swelling, or drainage.  He completed and tolerated antibiotics as prescribed by his mother per wound care provider without incidence.  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  Patient denies any systemic or local signs of infection and has been adherent to dressing changes     5/13/2025 44-year-old male with PMH DM2, HTN, anxiety, history of opioid abuse, tobacco use, obesity who presents with bilateral great toe diabetic ulcerations.  Patient states they have been present for approximately 2 years.  They developed after wearing a new pair of steel toed shoes.  Patient has been seeing a wound care specialist at [facility] without significant improvement.  Patient states he was recently hospitalized about a month ago for left foot cellulitis from his left great toe wound.  Patient states imaging was concerning for osteomyelitis however his general surgeon/wound care specialist recommended local wound care and antibiotic management.  Patient has been following up with his wound care specialist since discharge.  Patient was advised to seek second opinion for further evaluation and management of  bilateral great toes.  Patient states that he was recently seen on 5/8/2025 and represcribed antibiotics.  He denies any significant redness, swelling, or pain at this time in his left great toe.  Patient denies any nausea, vomiting, fever, chills, shortness of breath     The following portions of the patient's history were reviewed and updated as appropriate:   Problem List[1]  Past Medical History[2]  Past Surgical History[3]  Social History[4]   Current Medications[5]  Family History[6]   Review of Systems  Constitutional:  Negative for chills and fever.   Respiratory:  Negative for chest tightness and shortness of breath.    Cardiovascular:  Negative for leg swelling.   Musculoskeletal:  Positive for arthralgias.   Skin:  Positive for wound.     Objective:  /76   Pulse 88   Temp (!) 96.1 °F (35.6 °C) (Tympanic)   Resp 20     Physical Exam    Constitutional:       General: He is not in acute distress.     Appearance: He is not ill-appearing.   Cardiovascular:      Comments: DP pulse 2/4, PT pulse nonpalpable  CRT to distal digits less than 3 seconds  Skin temperature gradient decreased from knees to digits bilaterally  Absent pedal hair growth  Hemosiderin deposits noted to bilateral lower extremities.  No significant edema noted  Musculoskeletal:      Comments: Mild pes planus bilaterally  Hallux rigidus noted to bilateral first MPJs with palpable bony prominences dorsal medial aspect of bilateral MPJs  MMT 5/5 at level of ankle.  No pain with palpation of posterior calves   Skin:     Capillary Refill: Capillary refill takes less than 2 seconds.      Comments: Left great toe plantar IPJ diabetic ulceration with primarily granular base and hyperkeratotic periwound.  Probes deep adjacent to tendon sheath without tendon exposure no drainage expressed.  No appreciable edema or erythema.  No crepitus, no fluctuance     Right great toe plantar medial IPJ ulceration with granular base.  Probes deep without  probe to bone.  Adjacent to MPJ capsule.  Hyperkeratotic periwound.  No surrounding erythema or edema, no crepitus, no fluctuance, no malodor   Neurological:      Comments: Moderate decrease in gross sensation, decrease in protective sensation   Wound 05/13/25 Diabetic Ulcer Toe D1, great Right (Active)   Wound Image   06/03/25 1325   Enter Castillo score: Castillo Grade 1: Partial or full-thickness ulcer (superficial) 05/27/25 0854   Wound Description Pink 06/03/25 1330   Non-staged Wound Description Full thickness 06/03/25 1330   Wound Length (cm) 0.3 cm 06/03/25 1330   Wound Width (cm) 1 cm 06/03/25 1330   Wound Depth (cm) 0.6 cm 06/03/25 1330   Wound Surface Area (cm^2) 0.24 cm^2 06/03/25 1330   Wound Volume (cm^3) 0.094 cm^3 06/03/25 1330   Calculated Wound Volume (cm^3) 0.18 cm^3 06/03/25 1330   Change in Wound Size % -800 06/03/25 1330   Number of underminings 1 06/03/25 1330   Undermining 1 0.4 06/03/25 1330   Undermining 1 is depth extending from 12-12 06/03/25 1330   Drainage Amount Small 06/03/25 1330   Drainage Description Serosanguineous 06/03/25 1330   Viola-wound Assessment Callus 06/03/25 1330   Dressing Status Intact 06/03/25 1330       Wound 05/13/25 Diabetic Ulcer Toe D1, great Left (Active)   Wound Image   06/03/25 1327   Enter Castillo score: Castillo Grade 1: Partial or full-thickness ulcer (superficial) 05/27/25 0855   Wound Description [place];Pale 06/03/25 1333   Non-staged Wound Description Full thickness 06/03/25 1333   Wound Length (cm) 0.4 cm 06/03/25 1333   Wound Width (cm) 0.5 cm 06/03/25 1333   Wound Depth (cm) 0.5 cm 06/03/25 1333   Wound Surface Area (cm^2) 0.16 cm^2 06/03/25 1333   Wound Volume (cm^3) 0.052 cm^3 06/03/25 1333   Calculated Wound Volume (cm^3) 0.1 cm^3 06/03/25 1333   Change in Wound Size % -100 06/03/25 1333   Number of underminings 1 06/03/25 1333   Undermining 1 0.3 06/03/25 1333   Undermining 1 is depth extending from 12-12 06/03/25 1333   Drainage Amount Small 06/03/25 1333    Drainage Description Serosanguineous 06/03/25 1333   Viola-wound Assessment Callus 06/03/25 1333   Dressing Status Intact 06/03/25 1333                              Cast Application    Date/Time: 6/3/2025 1:15 PM    Performed by: Anjali Titus DPM  Authorized by: Anjali Titus DPM    Other Assisting Provider: No    Verbal consent obtained?: Yes    Risks and benefits: Risks, benefits and alternatives were discussed    Consent given by:  Patient  Time Out:     Time out: Immediately prior to the procedure a time out was called    Patient states understanding of procedure being performed: Yes    Patient identity confirmed:  Verbally with patient  Pre-procedure details:     Sensation:  Unchanged  Procedure details:     Laterality:  Left    Location:  Leg    Leg:  L lower leg    Cast type:  Total contact    Supplies used: Acticoat, Allevyn foam, TCC cast padding, TCC cast.  Post-procedure details:     Pain:  Unchanged    Sensation:  Unchanged    Patient tolerance of procedure:  Tolerated well, no immediate complications  Comments:      TOTAL CONTACT CAST  Total Contact Casting is a method of pressure relief that has been shown to have the highest rate of wound healing. Total Contact Casting is considered the gold standard for reducing foot wound pressure.  What is a Total Contact Cast?  This casting method helps promote healing and reduce pressure in the area of the wound. The cast works to redistribute weight, minimize pressure and reduce friction on the foot. The cast consists of two parts - the hard cast and a walking boot. Certain types of wounds heal better and more efficiently with Total Contact Casting. Your doctor has determined that this method of treatment is best for your wound.  How long will I be wearing a cast?  The length of treatment varies from patient to patient. We will reassess your wound weekly for continued need for casting. Usually, the cast is changed weekly.  Sometimes, as wounds become  smaller and drain less, the cast may be changed every 2-3 weeks depending on your doctor's assessment.  How will this cast change my lifestyle?  The cast takes 24 hours to cure and we ask that you absolutely minimize weight bearing during this time.  Never put anything into the cast such as powders or something to scratch the skin under the cast.   You will need to wear the walking boot at all times - you can cover it with a pillow case while in bed. A pillow case on the cast and a long thick sock on your other foot will protect the non-casted foot from getting irritated by the cast.  The cast must be kept dry at all times. Do not take showers while the cast is on. Cover the cast with a plastic bag to protect it from getting wet during poor weather. It's VERY important that you call the office immediately if you get the cast wet (or) you feel any type of pain or even mild irritation in the cast.  Do not walk without the outer walking boot firmly attached to the cast.  Since you will not be walking as much as you normally do, you will need some assistance from family and friends for certain tasks. This is common when wearing any type of cast and you will find that people are eager to assist you while you are taking the necessary time to heal.  If you have any questions about what you should or should not be doing, please feel free to contact us.  Once your wound is healed, you will need to be fitted for proper footwear to ensure that the wound does not return. You may be casted until this process is completed. It is important to avoid old shoes and habits that may have led to the development of your wound.                  Wound Instructions:  Orders Placed This Encounter   Procedures    Wound cleansing and dressings Right Toe D1, great     Right Great Toes         Wash your hands with soap and water.  Remove old dressing, discard into plastic bag and place in trash.  Cleanse the wound with mild soap and water prior  to applying a clean dressing. Do not use tissue or cotton balls. Do not scrub the wound. Pat dry using gauze.  Shower no   May purchase cast covers.        Apply puracol ag to the bilateral great toe wounds.  Cover with gauze.  Secure with tape.  Change dressing every other day        Continue to wear off-loading devices  Off-loading Instructions: Surgical Shoes with off-loading pads     Keep weight and pressure off wound at all times. Try to walk with pressure on the heels.  Wear off-loading device as directed by your physician. Put on immediately when rising in the morning and remove when going to bed.        Please try to consume 3-4 servings of protein (30g) each day.   - Each serving of protein should contain about 30 mg protein.   - Good sources of protein include, lean meats (fish, chicken, etc), eggs, dairy products (yogurt, cheese), tofu, legumes (chickpeas, lentils, peas, black beans), nuts (cashew, walnut, peanuts, etc), & quinoa.        If you develop fever, chills, nausea or vomiting, increase in drainage or foul smelling drainage go to the closest emergency department for evaluation.     Standing Status:   Future     Expiration Date:   6/12/2025    Wound cleansing and dressings Left Toe D1, great     Left great toe ulcer     TCC applied today by Dr. Titus     Off-loading Instructions:    Total Contact Cast Instructions:   Do not get cast wet.   Contact wound center if there is a foul odor or becomes uncomfortable due to feeling tight or swelling.   Do not use objects down inside of cast to scratch.   Do not walk on cast without walking boot.    TCC printed instructions given to patient     Follow up on Thursday 6/5/2025     Standing Status:   Future     Expiration Date:   6/5/2025    Cast Application     This order was created via procedure documentation    Debridement     This order was created via procedure documentation    Wound Procedure Treatment Left Toe D1, great     This order was created via  "procedure documentation    Wound Procedure Treatment Right Toe D1, great     This order was created via procedure documentation         Anjali Titus DPM      Portions of the record may have been created with voice recognition software. Occasional wrong word or \"sound a like\" substitutions may have occurred due to the inherent limitations of voice recognition software. Read the chart carefully and recognize, using context, where substitutions have occurred.           [1]   Patient Active Problem List  Diagnosis    Hyponatremia    Diabetic foot infection  (HCC)    Opioid abuse (HCC)    Diabetes mellitus (HCC)    Class 1 obesity due to excess calories in adult   [2]   Past Medical History:  Diagnosis Date    Class 1 obesity in adult     Diabetes mellitus (HCC)     Opioid abuse (HCC)    [3]   Past Surgical History:  Procedure Laterality Date    NO PAST SURGERIES N/A    [4]   Social History  Socioeconomic History    Marital status: Unknown   Tobacco Use    Smoking status: Every Day    Smokeless tobacco: Never   Substance and Sexual Activity    Alcohol use: Not Currently    Drug use: Not Currently     Types: Fentanyl     Social Drivers of Health     Financial Resource Strain: Not At Risk (4/3/2025)    Received from [facility]    Financial Insecurity     In the last 12 months did you skip medications to save money?: No     In the last 12 months was there a time when you needed to see a doctor but could not because of cost?: No   Food Insecurity: No Food Insecurity (4/3/2025)    Received from [facility]    Food Insecurity     In the last 12 months did you ever eat less than you felt you should because there wasn't enough money for food?: No   Transportation Needs: No Transportation Needs (4/3/2025)    Received from [facility]    Transportation Needs     In the last 12 months have you ever had to go without healthcare because you didn't have a way to get there?: No "   Social Connections: Socially Integrated (4/3/2025)    Received from [facility]    Social Connection     Do you often feel lonely?: No   Housing Stability: Not At Risk (4/3/2025)    Received from [facility]    Housing Stability     Are you worried that in the next 2 months you may not have stable housing?: No   [5]   Current Outpatient Medications:     atorvastatin (LIPITOR) 10 mg tablet, Take by mouth, Disp: , Rfl:     cloNIDine (CATAPRES) 0.3 mg tablet, Take 0.3 mg by mouth 2 (two) times a day, Disp: , Rfl:     Empagliflozin (JARDIANCE) 10 MG TABS tablet, Take 25 mg by mouth daily, Disp: , Rfl:     gabapentin (NEURONTIN) 400 mg capsule, Take by mouth, Disp: , Rfl:     insulin lispro (HumaLOG) 100 units/mL injection pen, Inject under the skin, Disp: , Rfl:     Jardiance 25 MG TABS, Take by mouth, Disp: , Rfl:     Lantus SoloStar 100 units/mL SOPN, Inject 70 Units under the skin daily, Disp: , Rfl:     lisinopril (ZESTRIL) 20 mg tablet, Take 20 mg by mouth, Disp: , Rfl:     metFORMIN (GLUCOPHAGE) 1000 MG tablet, Take by mouth, Disp: , Rfl:     pantoprazole (PROTONIX) 40 mg tablet, Take by mouth, Disp: , Rfl:     Sublocade 300 MG/1.5ML, Inject under the skin, Disp: , Rfl:   No current facility-administered medications for this visit.  [6]   Family History  Problem Relation Name Age of Onset    Diabetes Mother

## 2025-06-03 NOTE — PROGRESS NOTES
Wound Procedure Treatment Right Toe D1, great    Performed by: Michaela Reyes RN  Authorized by: Anjali Titus DPM  Associated wounds:   Wound 05/13/25 Diabetic Ulcer Toe D1, great Right    Wound cleansed with:  NSS   Applied primary dressing:  Collagen dressing   Applied secondary dressing:  Gauze   Dressing secured with:  Kendra and Tape   Offloading device appllied:  Surgical shoe   Comments:  Puracol plus AG

## 2025-06-03 NOTE — PATIENT INSTRUCTIONS
Orders Placed This Encounter   Procedures    Wound cleansing and dressings Right Toe D1, great     Right Great Toes         Wash your hands with soap and water.  Remove old dressing, discard into plastic bag and place in trash.  Cleanse the wound with mild soap and water prior to applying a clean dressing. Do not use tissue or cotton balls. Do not scrub the wound. Pat dry using gauze.  Shower no   May purchase cast covers.        Apply puracol ag to the bilateral great toe wounds.  Cover with gauze.  Secure with tape.  Change dressing every other day        Continue to wear off-loading devices  Off-loading Instructions: Surgical Shoes with off-loading pads     Keep weight and pressure off wound at all times. Try to walk with pressure on the heels.  Wear off-loading device as directed by your physician. Put on immediately when rising in the morning and remove when going to bed.        Please try to consume 3-4 servings of protein (30g) each day.   - Each serving of protein should contain about 30 mg protein.   - Good sources of protein include, lean meats (fish, chicken, etc), eggs, dairy products (yogurt, cheese), tofu, legumes (chickpeas, lentils, peas, black beans), nuts (cashew, walnut, peanuts, etc), & quinoa.        If you develop fever, chills, nausea or vomiting, increase in drainage or foul smelling drainage go to the closest emergency department for evaluation.     Standing Status:   Future     Expiration Date:   6/12/2025    Wound cleansing and dressings Left Toe D1, great     Left great toe ulcer     TCC applied today by Dr. Titus     Off-loading Instructions:    Total Contact Cast Instructions:   Do not get cast wet.   Contact wound center if there is a foul odor or becomes uncomfortable due to feeling tight or swelling.   Do not use objects down inside of cast to scratch.   Do not walk on cast without walking boot.    TCC printed instructions given to patient     Follow up on Thursday 6/5/2025      Standing Status:   Future     Expiration Date:   6/5/2025

## 2025-06-05 ENCOUNTER — OFFICE VISIT (OUTPATIENT)
Dept: WOUND CARE | Facility: CLINIC | Age: 45
End: 2025-06-05
Payer: COMMERCIAL

## 2025-06-05 VITALS
DIASTOLIC BLOOD PRESSURE: 80 MMHG | TEMPERATURE: 97.7 F | SYSTOLIC BLOOD PRESSURE: 130 MMHG | HEART RATE: 70 BPM | RESPIRATION RATE: 18 BRPM

## 2025-06-05 DIAGNOSIS — L97.529 DIABETIC ULCER OF LEFT GREAT TOE (HCC): Primary | ICD-10-CM

## 2025-06-05 DIAGNOSIS — E11.621 DIABETIC ULCER OF LEFT GREAT TOE (HCC): Primary | ICD-10-CM

## 2025-06-05 PROCEDURE — 29445 APPL RIGID TOT CNTC LEG CAST: CPT | Performed by: PODIATRIST

## 2025-06-05 NOTE — PROGRESS NOTES
Patient ID: Beau Daly is a 44 y.o. male Date of Birth 1980       Chief Complaint   Patient presents with    Follow Up Wound Care Visit     Wound great toe  removal and reapplication of TCC left leg       Allergies:  Patient has no known allergies.    Diagnosis:  1. Diabetic ulcer of left great toe (HCC)  -     Wound cleansing and dressings Left Toe D1, great; Future; Expected date: 06/12/2025     Diagnosis ICD-10-CM Associated Orders   1. Diabetic ulcer of left great toe (HCC)  E11.621 Wound cleansing and dressings Left Toe D1, great    L97.529            Assessment & Plan:  See wound orders.   Right foot Puracol  Left foot Acticoat, TCC     -LLE GT plantar diabetic ulceration.  Stable with hyperkeratotic periwound.  No debridement performed  -RLE GT dressing remained clean dry and intact  -TCC was applied to the patients left  foot and lower leg.  The purpose of the TCC is to remove pressure from the foot ulcer upon ambulation and heal the diabetic ulcers. Prior to placing the TCC, consent was obtained and the patient was placed in a position to access the lower leg and foot.  The patient tolerated the TCC application well and was instructed in its use.  They are to call with any concerns about swelling or pain.  Present to the ED for removal of TCC if concerns arise  -Utilize cast protector to keep dressings clean and dry.  Patient advised on how to obtain cast protectors  -Once your wound is healed, you will need to be fitted for proper footwear to ensure that the wound does not return. You may be casted until this process is completed. It is important to avoid old shoes and habits that may have led to the development of your wound.   -LEADs 5/27/25 no evidence of significant lower extremity occlusive disease tolerate  -Smoking cessation reviewed again at today's evaluation  - Patient counseled on the need for continued strict glycemic control  - Patient counseled that he is at a high risk for  further infection and osteomyelitis.  This may result in need for surgical intervention such as amputation  - Patient counseled on worsening signs of infection and will present to the ED if these occur  - Follow-up Thursday for removal of TCC and evaluation        From prior visit:  -Chronic bilateral plantar great toe diabetic wounds.  Patient states wounds have reoccurred multiple times over the last 2 years  -A1c 8% 4/3/2025  - Currently seeing wound care specialist at [facility], Dr. Hahn.  Presents for second opinion  -IRWIN 05/13/25 Wound Center right 0.94, left 0.91  - Hospital course reviewed admission at [facility] 4/30/2025.  Left lower extremity cellulitis.  Discharged on cefadroxil 500 mg twice daily.  Ciprofloxacin 500 mg twice daily.  Per hospital notes general surgery stated patient required only local wound care and no surgical intervention.              -MRI left foot 4/5/2025 there is abnormal marrow signal involving the distal phalanx of   the great toe. There is mild marrow enhancement suspicious for osteomyelitis.               -CT left 4/30/2025 no evidence of acute fracture or OM.  Evidence of old AVN and mild fragmentation of left third metatarsal head.  Mild degenerative arthritis noted.  Evidence of small soft tissue ulcer in left great toe with associated left great toe cellulitis and no evidence of drainable soft tissue abscess or collection              -XR left foot no OM, no MAXIMINO  -CT 4/3/2025 negative for osteomyelitis  - 5/8/2025 wound care note reviewed dr. Hahn.  Antibiotics prescribed ciprofloxacin 500 mg twice daily for 14 days, cefadroxil 500 mg twice daily for 14 days  - Antibiotics complete    Subjective:   HPI    6/5/2025 bilateral plantar great toe wounds.  Patient tolerated left lower extremity total contact cast without pain, redness, swelling, or falls reported.  Patient is interested in continuation of total contact casting.  He states he did attempt total contact  casting in the past however it was a different system and he was smoking, poorly controlled with his blood sugars, and nonadherent with weightbearing restrictions at that time so he did not have improvement.  Patient states he has been adherent with ADLs only and elevation.  He continues to working on smoking cessation.  He continues to closely monitor his diet.    6/3/2025 bilateral plantar great toe wounds.  Patient completed vascular studies.  Patient has been wearing offloading surgical shoes as directed.  He denies any significant change since last exam.  He denies any local or systemic signs of infection.     5/27/25 for continued valuation management of chronic bilateral plantar great toe wounds.  Patient states he has been trying to wear surgical shoes as directed.  He denies any redness, swelling, or drainage.  He completed and tolerated antibiotics as prescribed by his mother per wound care provider without incidence.  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  Patient denies any systemic or local signs of infection and has been adherent to dressing changes     5/13/2025 44-year-old male with PMH DM2, HTN, anxiety, history of opioid abuse, tobacco use, obesity who presents with bilateral great toe diabetic ulcerations.  Patient states they have been present for approximately 2 years.  They developed after wearing a new pair of steel toed shoes.  Patient has been seeing a wound care specialist at [facility] without significant improvement.  Patient states he was recently hospitalized about a month ago for left foot cellulitis from his left great toe wound.  Patient states imaging was concerning for osteomyelitis however his general surgeon/wound care specialist recommended local wound care and antibiotic management.  Patient has been following up with his wound care specialist since discharge.  Patient was advised to seek  second opinion for further evaluation and management of bilateral great toes.  Patient states that he was recently seen on 5/8/2025 and represcribed antibiotics.  He denies any significant redness, swelling, or pain at this time in his left great toe.  Patient denies any nausea, vomiting, fever, chills, shortness of breath     The following portions of the patient's history were reviewed and updated as appropriate:   Problem List[1]  Past Medical History[2]  Past Surgical History[3]  Social History[4]   Current Medications[5]  Family History[6]   Review of Systems  Constitutional:  Negative for chills and fever.   Respiratory:  Negative for chest tightness and shortness of breath.    Cardiovascular:  Negative for leg swelling.   Musculoskeletal:  Positive for arthralgias.   Skin:  Positive for wound.    Objective:  /80   Pulse 70   Temp 97.7 °F (36.5 °C)   Resp 18     Physical Exam  Constitutional:       General: He is not in acute distress.     Appearance: He is not ill-appearing.   Cardiovascular:      Comments: DP pulse 2/4, PT pulse nonpalpable  CRT to distal digits less than 3 seconds  Skin temperature gradient decreased from knees to digits bilaterally  Absent pedal hair growth  Hemosiderin deposits noted to bilateral lower extremities.  No significant edema noted  Musculoskeletal:      Comments: Mild pes planus bilaterally  Hallux rigidus noted to bilateral first MPJs with palpable bony prominences dorsal medial aspect of bilateral MPJs  MMT 5/5 at level of ankle.  No pain with palpation of posterior calves   Skin:     Capillary Refill: Capillary refill takes less than 2 seconds.      Comments: Left great toe plantar IPJ diabetic ulceration with primarily granular base and hyperkeratotic periwound.  Probes deep adjacent to tendon sheath without tendon exposure no drainage expressed.  No appreciable edema or erythema.  No crepitus, no fluctuance     Right great toe plantar dressing clean dry and  intact  Neurological:      Comments: Moderate decrease in gross sensation, decrease in protective sensation      Wound 05/13/25 Diabetic Ulcer Toe D1, great Left (Active)   Wound Image   06/05/25 1355   Enter Castillo score: Castillo Grade 1: Partial or full-thickness ulcer (superficial) 06/05/25 1356   Wound Description Pink;White;Pale 06/05/25 1356   Non-staged Wound Description Full thickness 06/05/25 1356   Wound Length (cm) 1.1 cm 06/05/25 1356   Wound Width (cm) 1.2 cm 06/05/25 1356   Wound Depth (cm) 0.3 cm 06/05/25 1356   Wound Surface Area (cm^2) 1.04 cm^2 06/05/25 1356   Wound Volume (cm^3) 0.207 cm^3 06/05/25 1356   Calculated Wound Volume (cm^3) 0.4 cm^3 06/05/25 1356   Change in Wound Size % -700 06/05/25 1356   Number of underminings 1 06/03/25 1333   Undermining 1 0.3 06/03/25 1333   Undermining 1 is depth extending from 12-12 06/03/25 1333   Drainage Amount Small 06/05/25 1356   Drainage Description Serosanguineous 06/05/25 1356   Viola-wound Assessment White;Maceration 06/05/25 1356   Dressing Status Intact 06/03/25 1333                         Cast Application    Date/Time: 6/5/2025 1:45 PM    Performed by: Anjali Titus DPM  Authorized by: Anjali Titus DPM    Other Assisting Provider: No    Verbal consent obtained?: Yes    Risks and benefits: Risks, benefits and alternatives were discussed    Consent given by:  Patient  Time Out:     Time out: Immediately prior to the procedure a time out was called    Patient states understanding of procedure being performed: Yes    Patient identity confirmed:  Verbally with patient  Pre-procedure details:     Sensation:  Unchanged  Procedure details:     Laterality:  Left    Location:  Leg    Leg:  L lower leg    Cast type:  Total contact    Supplies used: Acticoat, Allevyn, TCC cast padding, TCC cast.  Post-procedure details:     Pain:  Unchanged    Sensation:  Unchanged    Patient tolerance of procedure:  Tolerated well, no immediate  "complications  Comments:      TCC was applied to the patients Left foot and lower leg.  The purpose of the TCC is to remove pressure from the foot ulcer upon ambulation and heal the diabetic ulcers. Prior to placing the TCC, consent was obtained and the patient was placed in a position to access the lower leg and foot.  The patient tolerated the TCC application well and was instructed in its use.  They are to call with any concerns about swelling or pain.                 Wound Instructions:  Orders Placed This Encounter   Procedures    Wound cleansing and dressings Left Toe D1, great     Wound cleansing and dressings Left Toe D1, great       Left great toe ulcer    Clean wound with mild soap and water pat dry  Apply Acticoat 7 to wound bed then apply foam padding   TCC applied today by Dr. Titus      Off-loading Instructions:     Total Contact Cast Instructions:   Do not get cast wet.   Contact wound center if there is a foul odor or becomes uncomfortable due to feeling tight or swelling.   Do not use objects down inside of cast to scratch.   Do not walk on cast without walking boot.     TCC printed instructions given to patient     Follow up in 1 week     Standing Status:   Future     Expected Date:   6/12/2025     Expiration Date:   6/12/2025    Cast Application     This order was created via procedure documentation         Anjali Titus DPM      Portions of the record may have been created with voice recognition software. Occasional wrong word or \"sound a like\" substitutions may have occurred due to the inherent limitations of voice recognition software. Read the chart carefully and recognize, using context, where substitutions have occurred.          [1]   Patient Active Problem List  Diagnosis    Hyponatremia    Diabetic foot infection  (HCC)    Opioid abuse (HCC)    Diabetes mellitus (HCC)    Class 1 obesity due to excess calories in adult   [2]   Past Medical History:  Diagnosis Date    Class 1 obesity " in adult     Diabetes mellitus (HCC)     Opioid abuse (HCC)    [3]   Past Surgical History:  Procedure Laterality Date    NO PAST SURGERIES N/A    [4]   Social History  Socioeconomic History    Marital status: Unknown   Tobacco Use    Smoking status: Every Day    Smokeless tobacco: Never   Substance and Sexual Activity    Alcohol use: Not Currently    Drug use: Not Currently     Types: Fentanyl     Social Drivers of Health     Financial Resource Strain: Not At Risk (4/3/2025)    Received from [facility]    Financial Insecurity     In the last 12 months did you skip medications to save money?: No     In the last 12 months was there a time when you needed to see a doctor but could not because of cost?: No   Food Insecurity: No Food Insecurity (4/3/2025)    Received from [facility]    Food Insecurity     In the last 12 months did you ever eat less than you felt you should because there wasn't enough money for food?: No   Transportation Needs: No Transportation Needs (4/3/2025)    Received from [facility]    Transportation Needs     In the last 12 months have you ever had to go without healthcare because you didn't have a way to get there?: No   Social Connections: Socially Integrated (4/3/2025)    Received from [facility]    Social Connection     Do you often feel lonely?: No   Housing Stability: Not At Risk (4/3/2025)    Received from [facility]    Housing Stability     Are you worried that in the next 2 months you may not have stable housing?: No   [5]   Current Outpatient Medications:     atorvastatin (LIPITOR) 10 mg tablet, Take by mouth, Disp: , Rfl:     cloNIDine (CATAPRES) 0.3 mg tablet, Take 0.3 mg by mouth 2 (two) times a day, Disp: , Rfl:     Empagliflozin (JARDIANCE) 10 MG TABS tablet, Take 25 mg by mouth daily, Disp: , Rfl:     gabapentin (NEURONTIN) 400 mg capsule, Take by mouth, Disp: , Rfl:     insulin lispro  (HumaLOG) 100 units/mL injection pen, Inject under the skin, Disp: , Rfl:     Jardiance 25 MG TABS, Take by mouth, Disp: , Rfl:     Lantus SoloStar 100 units/mL SOPN, Inject 70 Units under the skin daily, Disp: , Rfl:     lisinopril (ZESTRIL) 20 mg tablet, Take 20 mg by mouth, Disp: , Rfl:     metFORMIN (GLUCOPHAGE) 1000 MG tablet, Take by mouth, Disp: , Rfl:     pantoprazole (PROTONIX) 40 mg tablet, Take by mouth, Disp: , Rfl:     Sublocade 300 MG/1.5ML, Inject under the skin, Disp: , Rfl:   [6]   Family History  Problem Relation Name Age of Onset    Diabetes Mother

## 2025-06-05 NOTE — PATIENT INSTRUCTIONS
Orders Placed This Encounter   Procedures    Wound cleansing and dressings Left Toe D1, great     Wound cleansing and dressings Left Toe D1, great       Left great toe ulcer    Clean wound with mild soap and water pat dry  Apply Acticoat 7 to wound bed then apply foam padding   TCC applied today by Dr. Titus      Off-loading Instructions:     Total Contact Cast Instructions:   Do not get cast wet.   Contact wound center if there is a foul odor or becomes uncomfortable due to feeling tight or swelling.   Do not use objects down inside of cast to scratch.   Do not walk on cast without walking boot.     TCC printed instructions given to patient     Follow up in 1 week     Standing Status:   Future     Expected Date:   6/12/2025     Expiration Date:   6/12/2025

## 2025-06-12 ENCOUNTER — OFFICE VISIT (OUTPATIENT)
Dept: WOUND CARE | Facility: CLINIC | Age: 45
End: 2025-06-12
Payer: COMMERCIAL

## 2025-06-12 VITALS
DIASTOLIC BLOOD PRESSURE: 78 MMHG | HEART RATE: 100 BPM | TEMPERATURE: 97.2 F | SYSTOLIC BLOOD PRESSURE: 150 MMHG | RESPIRATION RATE: 20 BRPM

## 2025-06-12 DIAGNOSIS — L97.519 DIABETIC ULCER OF RIGHT GREAT TOE (HCC): ICD-10-CM

## 2025-06-12 DIAGNOSIS — L97.529 DIABETIC ULCER OF LEFT GREAT TOE (HCC): Primary | ICD-10-CM

## 2025-06-12 DIAGNOSIS — E11.621 DIABETIC ULCER OF RIGHT GREAT TOE (HCC): ICD-10-CM

## 2025-06-12 DIAGNOSIS — E11.621 DIABETIC ULCER OF LEFT GREAT TOE (HCC): Primary | ICD-10-CM

## 2025-06-12 PROCEDURE — 97597 DBRDMT OPN WND 1ST 20 CM/<: CPT | Performed by: PODIATRIST

## 2025-06-12 PROCEDURE — 29445 APPL RIGID TOT CNTC LEG CAST: CPT | Performed by: PODIATRIST

## 2025-06-12 RX ORDER — LIDOCAINE 40 MG/G
CREAM TOPICAL ONCE
Status: COMPLETED | OUTPATIENT
Start: 2025-06-12 | End: 2025-06-12

## 2025-06-12 RX ADMIN — LIDOCAINE: 40 CREAM TOPICAL at 14:18

## 2025-06-12 NOTE — LETTER
June 12, 2025     Patient: Beau Daly  YOB: 1980  Date of Visit: 6/12/2025      To Whom it May Concern:    Beau Daly is under my professional care. Beau was seen in my office on 6/12/2025. Beau may return to work with limitations 6/16/25.  Please allow patient to adhere to desk and seated activities for work.  Allow patient to utilize assistive devices such as knee scooter or walker.  Please allow patient to wear surgical offloading shoe and lower extremity casting.    If you have any questions or concerns, please don't hesitate to call.         Sincerely,          Anjali Titus DPM        CC: No Recipients

## 2025-06-12 NOTE — PROGRESS NOTES
"Debridement     Date/Time: 6/12/2025 1:45 PM    Universal Protocol:  procedure performed by consultantConsent: Verbal consent obtained  Risks and benefits: risks, benefits and alternatives were discussed  Consent given by: patient  Time out: Immediately prior to procedure a \"time out\" was called to verify the correct patient, procedure, equipment, support staff and site/side marked as required.  Patient understanding: patient states understanding of the procedure being performed  Patient identity confirmed: verbally with patient    Debridement Details  Performed by: physician  Debridement type: selective  Pain control: lidocaine 4%    Post-debridement measurements  Length (cm): 0.3  Width (cm): 1  Depth (cm): 0.6  Percent debrided: 10%  Surface Area (cm^2): 0.24  Area Debrided (cm^2): 0.02  Volume (cm^3): 0.09    Devitalized tissue debrided: callus and slough  Instrument(s) utilized: curette  Technique utilized: nonexcisional  Bleeding: none  Hemostasis obtained with: not applicable  Procedural pain (0-10): 0  Post-procedural pain: 0   Response to treatment: procedure was tolerated well       "

## 2025-06-12 NOTE — PATIENT INSTRUCTIONS
Orders Placed This Encounter   Procedures    Wound cleansing and dressings Left Toe D1, great     Left great toe ulcer    Clean wound with mild soap and water pat dry  Apply Acticoat 7 to wound bed then apply foam padding   TCC applied today by Dr. Titus      Apply silicone bordered foam for protection to lateral foot    Off-loading Instructions:     Total Contact Cast Instructions:   Do not get cast wet.   Contact wound center if there is a foul odor or becomes uncomfortable due to feeling tight or swelling.   Do not use objects down inside of cast to scratch.   Do not walk on cast without walking boot.     TCC printed instructions given to patient      Follow up in 1 week     Standing Status:   Future     Expiration Date:   6/19/2025    Wound cleansing and dressings Right Toe D1, great     Right Great Toes         Wash your hands with soap and water.  Remove old dressing, discard into plastic bag and place in trash.  Cleanse the wound with mild soap and water prior to applying a clean dressing. Do not use tissue or cotton balls. Do not scrub the wound. Pat dry using gauze.  Shower no   May purchase cast covers.        Apply puracol ag to the bilateral great toe wounds.  Cover with gauze.  Secure with tape.  Change dressing every other day        Continue to wear off-loading devices  Off-loading Instructions: Surgical Shoes with off-loading pads     Keep weight and pressure off wound at all times. Try to walk with pressure on the heels.  Wear off-loading device as directed by your physician. Put on immediately when rising in the morning and remove when going to bed.        Please try to consume 3-4 servings of protein (30g) each day.   - Each serving of protein should contain about 30 mg protein.   - Good sources of protein include, lean meats (fish, chicken, etc), eggs, dairy products (yogurt, cheese), tofu, legumes (chickpeas, lentils, peas, black beans), nuts (cashew, walnut, peanuts, etc), & quinoa.        If  you develop fever, chills, nausea or vomiting, increase in drainage or foul smelling drainage go to the closest emergency department for evaluation.     Standing Status:   Future     Expiration Date:   6/19/2025

## 2025-06-19 ENCOUNTER — OFFICE VISIT (OUTPATIENT)
Dept: WOUND CARE | Facility: CLINIC | Age: 45
End: 2025-06-19
Payer: COMMERCIAL

## 2025-06-19 VITALS
DIASTOLIC BLOOD PRESSURE: 70 MMHG | RESPIRATION RATE: 16 BRPM | HEART RATE: 78 BPM | SYSTOLIC BLOOD PRESSURE: 120 MMHG | TEMPERATURE: 97 F

## 2025-06-19 DIAGNOSIS — E11.621 DIABETIC ULCER OF RIGHT GREAT TOE (HCC): ICD-10-CM

## 2025-06-19 DIAGNOSIS — L97.519 DIABETIC ULCER OF RIGHT GREAT TOE (HCC): ICD-10-CM

## 2025-06-19 DIAGNOSIS — L97.529 DIABETIC ULCER OF LEFT GREAT TOE (HCC): Primary | ICD-10-CM

## 2025-06-19 DIAGNOSIS — E11.621 DIABETIC ULCER OF LEFT GREAT TOE (HCC): Primary | ICD-10-CM

## 2025-06-19 DIAGNOSIS — S90.414A ABRASION OF THIRD TOE OF RIGHT FOOT, INITIAL ENCOUNTER: ICD-10-CM

## 2025-06-19 DIAGNOSIS — S90.414A ABRASION OF SECOND TOE OF RIGHT FOOT, INITIAL ENCOUNTER: ICD-10-CM

## 2025-06-19 PROCEDURE — 29445 APPL RIGID TOT CNTC LEG CAST: CPT | Performed by: PODIATRIST

## 2025-06-19 PROCEDURE — 97597 DBRDMT OPN WND 1ST 20 CM/<: CPT | Performed by: PODIATRIST

## 2025-06-19 PROCEDURE — 99214 OFFICE O/P EST MOD 30 MIN: CPT | Performed by: PODIATRIST

## 2025-06-19 PROCEDURE — 99213 OFFICE O/P EST LOW 20 MIN: CPT | Performed by: PODIATRIST

## 2025-06-19 NOTE — PROGRESS NOTES
Wound Procedure Treatment Right Toe D1, great    Performed by: Amanda Casanova RN  Authorized by: Anjali Titus DPM  Associated wounds:   Wound 05/13/25 Diabetic Ulcer Toe D1, great Right    Wound cleansed with:  NSS   Applied primary dressing:  Silver and Collagen dressing   Applied secondary dressing:  Gauze   Dressing secured with:  Kendra and Tape   Offloading device appllied:  Surgical shoe

## 2025-06-19 NOTE — PROGRESS NOTES
"Debridement   Wound 05/13/25 Diabetic Ulcer Toe D1, great Right     Date/Time: 6/19/2025 1:00 PM    Universal Protocol:  procedure performed by consultantConsent: Verbal consent obtained  Risks and benefits: risks, benefits and alternatives were discussed  Consent given by: patient  Time out: Immediately prior to procedure a \"time out\" was called to verify the correct patient, procedure, equipment, support staff and site/side marked as required.  Patient understanding: patient states understanding of the procedure being performed    Debridement Details  Performed by: physician  Debridement type: selective  Pain control: lidocaine 4%    Post-debridement measurements  Length (cm): 0.3  Width (cm): 0.7  Depth (cm): 0.5  Percent debrided: 100%  Surface Area (cm^2): 0.16  Area Debrided (cm^2): 0.16  Volume (cm^3): 0.05    Devitalized tissue debrided: callus  Instrument(s) utilized: curette  Technique utilized: nonexcisional  Bleeding: none  Hemostasis obtained with: not applicable  Procedural pain (0-10): 0  Post-procedural pain: 0   Response to treatment: procedure was tolerated well        "

## 2025-06-19 NOTE — PROGRESS NOTES
Wound Procedure Treatment    Performed by: Amanda Casanova RN  Authorized by: Anjali Titus DPM  Associated wounds:   Wound 06/19/25 Traumatic Abrasion Toe D2, second Right  Wound 06/19/25 Traumatic Abrasion Toe D3, third Right    Wound cleansed with:  NSS   Applied topical:  Mupirocin ointment   Applied secondary dressing:  Gauze   Dressing secured with:  Kendra and Tape

## 2025-06-19 NOTE — PATIENT INSTRUCTIONS
Orders Placed This Encounter   Procedures    Wound cleansing and dressings Left Toe D1, great     Wound cleansing and dressings Left Toe D1, great       Left great toe ulcer    Clean wound with mild soap and water pat dry  Apply Acticoat 7 to wound bed then apply foam padding   TCC applied today by Dr. Titus      Apply silicone bordered foam for protection to lateral foot     Off-loading Instructions:     Total Contact Cast Instructions:   Do not get cast wet.   Contact wound center if there is a foul odor or becomes uncomfortable due to feeling tight or swelling.   Do not use objects down inside of cast to scratch.   Do not walk on cast without walking boot.     TCC printed instructions given to patient      Follow up in 1 week      ·     Standing Status:   Future     Expected Date:   6/26/2025     Expiration Date:   6/26/2025    Cast Application     This order was created via procedure documentation    Debridement Right Toe D1, great     This order was created via procedure documentation    Wound cleansing and dressings Right Toe D1, great     Wound cleansing and dressings Right Toe D1, great     Right Great Toes       Wash your hands with soap and water.  Remove old dressing, discard into plastic bag and place in trash.  Cleanse the wound with mild soap and water prior to applying a clean dressing. Do not use tissue or cotton balls. Do not scrub the wound. Pat dry using gauze.  Shower no   May purchase cast covers.      Apply puracol ag to the right great toe wound.  Cover with gauze.  Secure with tape.  Change dressing every other day      Continue to wear off-loading devices  Off-loading Instructions: Surgical Shoes with off-loading pads     Keep weight and pressure off wound at all times. Try to walk with pressure on the heels.  Wear off-loading device as directed by your physician. Put on immediately when rising in the morning and remove when going to bed.      Please try to consume 3-4 servings of protein  (30g) each day.   - Each serving of protein should contain about 30 mg protein.   - Good sources of protein include, lean meats (fish, chicken, etc), eggs, dairy products (yogurt, cheese), tofu, legumes (chickpeas, lentils, peas, black beans), nuts (cashew, walnut, peanuts, etc), & quinoa.        If you develop fever, chills, nausea or vomiting, increase in drainage or foul smelling drainage go to the closest emergency department for evaluation.    Wound cleansing and dressings     Wash 2 nd and third toes on right foot with soap and water   Apply dressing dressing daily     Standing Status:   Future     Expected Date:   6/26/2025     Expiration Date:   6/26/2025

## 2025-06-19 NOTE — PROGRESS NOTES
Patient ID: Beau Daly is a 44 y.o. male Date of Birth 1980       Chief Complaint   Patient presents with    Follow Up Wound Care Visit     Wounds bilateral great toes       Allergies:  Patient has no known allergies.    Diagnosis:  1. Diabetic ulcer of left great toe (HCC)  -     Wound cleansing and dressings Left Toe D1, great; Future; Expected date: 06/26/2025  -     Wound Procedure Treatment Left Toe D1, great  2. Abrasion of second toe of right foot, initial encounter  -     Wound cleansing and dressings; Future; Expected date: 06/26/2025  -     Wound Procedure Treatment  3. Abrasion of third toe of right foot, initial encounter  -     Wound cleansing and dressings; Future; Expected date: 06/26/2025  -     Wound Procedure Treatment  4. Diabetic ulcer of right great toe (HCC)  -     Wound cleansing and dressings Right Toe D1, great  -     Wound Procedure Treatment Right Toe D1, great     Diagnosis ICD-10-CM Associated Orders   1. Diabetic ulcer of left great toe (HCC)  E11.621 Wound cleansing and dressings Left Toe D1, great    L97.529 Wound Procedure Treatment Left Toe D1, great      2. Abrasion of second toe of right foot, initial encounter  S90.414A Wound cleansing and dressings     Wound Procedure Treatment      3. Abrasion of third toe of right foot, initial encounter  S90.414A Wound cleansing and dressings     Wound Procedure Treatment      4. Diabetic ulcer of right great toe (HCC)  E11.621 Wound cleansing and dressings Right Toe D1, great    L97.519 Wound Procedure Treatment Right Toe D1, great           Assessment & Plan:  See wound orders.   Right foot Puracol  Right dorsal 2nd and 3rd toe abrasions, Puracol  Left foot Acticoat, TCC     -RLE GT plantar diabetic ulceration.  Slightly smaller in size.  Selective debridement performed hyperkeratotic periwound.  No SOI              - Continue with specialized offloading surgical shoe and dressing changes  -RLE new dorsal 2nd and 3rd toe  traumatic abrasions.  Noninfected, not full-thickness.  Apply mupirocin ointment and DSD  -LLE GT plantar diabetic ulceration.  Stable with hyperkeratotic periwound.  Slightly smaller in size.  No debridement performed  -TCC was applied to the patients left  foot and lower leg.  The purpose of the TCC is to remove pressure from the foot ulcer upon ambulation and heal the diabetic ulcers. Prior to placing the TCC, consent was obtained and the patient was placed in a position to access the lower leg and foot.  The patient tolerated the TCC application well and was instructed in its use.  They are to call with any concerns about swelling or pain.  Present to the ED for removal of TCC if concerns arise  -Utilize cast protector to keep dressings clean and dry.  Patient advised on how to obtain cast protectors  -Once your wound is healed, you will need to be fitted for proper footwear to ensure that the wound does not return. You may be casted until this process is completed. It is important to avoid old shoes and habits that may have led to the development of your wound.   -LEADs 5/27/25 no evidence of significant lower extremity occlusive disease tolerate  -Smoking cessation reviewed again at today's evaluation  - Patient counseled on the need for continued strict glycemic control.  A1c 8% 4/30/2025.  A1c 6/10/2025 6.5% [facility]  - Patient counseled that he is at a high risk for further infection and osteomyelitis.  This may result in need for surgical intervention such as amputation  - Patient counseled on worsening signs of infection and will present to the ED if these occur    - Follow-up 1 week for bilateral wound treatment and reapplication of TCC to left lower extremity    Subjective:   HPI    6/19/25 bilateral plantar great toe wounds.  Patient tolerated left lower extremity TCC cast without pain, irritation.  Patient does report a fall when he was using the knee scooter.  He reports minor skin abrasions including  abrasion to right 2nd and 3rd toe.  He states the fall was an accident and not due to loss of balance or difficulty with use of TCC.  Patient has difficulty adhering to activity restrictions.  Patient states he has been changing dressings and utilizing offloading shoe to the right lower extremity.  Patient denies any local or systemic signs of infection    6/12/25 bilateral plantar great toe wounds.  Patient tolerated TCC cast well.  Patient did not adhere to ADLs only however he denies any redness, swelling, or pain coming from his left total contact cast limb.  He has been keeping his cast clean and dry.  Patient states he has been changing his right foot dressing as directed.  He has been utilizing offloading shoe as directed.  He denies any redness, swelling, drainage.  He denies any systemic signs of infection.  Patient brings with him today paperwork for short-term disability as well as return to work paperwork if appropriate     6/5/2025 bilateral plantar great toe wounds.  Patient tolerated left lower extremity total contact cast without pain, redness, swelling, or falls reported.  Patient is interested in continuation of total contact casting.  He states he did attempt total contact casting in the past however it was a different system and he was smoking, poorly controlled with his blood sugars, and nonadherent with weightbearing restrictions at that time so he did not have improvement.  Patient states he has been adherent with ADLs only and elevation.  He continues to working on smoking cessation.  He continues to closely monitor his diet.     6/3/2025 bilateral plantar great toe wounds.  Patient completed vascular studies.  Patient has been wearing offloading surgical shoes as directed.  He denies any significant change since last exam.  He denies any local or systemic signs of infection.     5/27/25 for continued valuation management of chronic bilateral plantar great toe wounds.  Patient states he has  been trying to wear surgical shoes as directed.  He denies any redness, swelling, or drainage.  He completed and tolerated antibiotics as prescribed by his mother per wound care provider without incidence.  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  Patient denies any systemic or local signs of infection and has been adherent to dressing changes     5/13/2025 44-year-old male with PMH DM2, HTN, anxiety, history of opioid abuse, tobacco use, obesity who presents with bilateral great toe diabetic ulcerations.  Patient states they have been present for approximately 2 years.  They developed after wearing a new pair of steel toed shoes.  Patient has been seeing a wound care specialist at [facility] without significant improvement.  Patient states he was recently hospitalized about a month ago for left foot cellulitis from his left great toe wound.  Patient states imaging was concerning for osteomyelitis however his general surgeon/wound care specialist recommended local wound care and antibiotic management.  Patient has been following up with his wound care specialist since discharge.  Patient was advised to seek second opinion for further evaluation and management of bilateral great toes.  Patient states that he was recently seen on 5/8/2025 and represcribed antibiotics.  He denies any significant redness, swelling, or pain at this time in his left great toe.  Patient denies any nausea, vomiting, fever, chills, shortness of breath     The following portions of the patient's history were reviewed and updated as appropriate:   Problem List[1]  Past Medical History[2]  Past Surgical History[3]  Social History[4]   Current Medications[5]  Family History[6]   Review of Systems  Constitutional:  Negative for chills and fever.   Respiratory:  Negative for chest tightness and shortness of breath.    Cardiovascular:  Negative for leg swelling.    Musculoskeletal:  Positive for arthralgias.   Skin:  Positive for wound.    Objective:  /70   Pulse 78   Temp (!) 97 °F (36.1 °C)   Resp 16     Physical Exam  Constitutional:       General: He is not in acute distress.     Appearance: He is not ill-appearing.   Cardiovascular:      Comments: DP pulse 2/4, PT pulse nonpalpable  CRT to distal digits less than 3 seconds  Skin temperature gradient decreased from knees to digits bilaterally  Absent pedal hair growth  Hemosiderin deposits noted to bilateral lower extremities.  No significant edema noted  Musculoskeletal:      Comments: Mild pes planus bilaterally  Hallux rigidus noted to bilateral first MPJs with palpable bony prominences dorsal medial aspect of bilateral MPJs  MMT 5/5 at level of ankle.  No pain with palpation of posterior calves   Skin:     Capillary Refill: Capillary refill takes less than 2 seconds.      Comments: Left great toe plantar IPJ diabetic ulceration with primarily granular base and hyperkeratotic periwound..  No appreciable edema or erythema.  No crepitus, no fluctuance     Right right great toe plantar IPJ ulceration.  Primarily granular base with some fibrotic slough noted.  Hyperkeratotic periwound.  No surrounding erythema or edema.  No crepitus, no fluctuance, no malodor.  No drainage expressed    Right dorsal 2nd and 3rd toe abrasion.  Not full-thickness.  No surrounding erythema or edema.  No SOI        Neurological:      Comments: Moderate decrease in gross sensation, decrease in protective sensation    Wound 05/13/25 Diabetic Ulcer Toe D1, great Right (Active)   Wound Image   06/19/25 1311   Enter Castillo score: Castillo Grade 1: Partial or full-thickness ulcer (superficial) 06/19/25 1314   Wound Description Pink 06/19/25 1314   Non-staged Wound Description Full thickness 06/19/25 1314   Wound Length (cm) 0.2 cm 06/19/25 1314   Wound Width (cm) 0.7 cm 06/19/25 1314   Wound Depth (cm) 0.5 cm 06/19/25 1314   Wound Surface  Area (cm^2) 0.11 cm^2 06/19/25 1314   Wound Volume (cm^3) 0.037 cm^3 06/19/25 1314   Calculated Wound Volume (cm^3) 0.07 cm^3 06/19/25 1314   Change in Wound Size % -250 06/19/25 1314   Number of underminings 1 06/19/25 1314   Undermining 1 0.2 06/19/25 1314   Undermining 1 is depth extending from 12 - 4  12 deepest 06/19/25 1314   Drainage Amount Small 06/19/25 1314   Drainage Description Serosanguineous 06/19/25 1314   Viola-wound Assessment Callus;Rufino 06/19/25 1314   Dressing Status Intact 06/03/25 1330       Wound 05/13/25 Diabetic Ulcer Toe D1, great Left (Active)   Wound Image   06/19/25 1312   Enter Castillo score: Castillo Grade 1: Partial or full-thickness ulcer (superficial) 06/19/25 1313   Wound Description Pink 06/19/25 1313   Non-staged Wound Description Full thickness 06/19/25 1313   Wound Length (cm) 0.2 cm 06/19/25 1313   Wound Width (cm) 0.2 cm 06/19/25 1313   Wound Depth (cm) 0.3 cm 06/19/25 1313   Wound Surface Area (cm^2) 0.03 cm^2 06/19/25 1313   Wound Volume (cm^3) 0.006 cm^3 06/19/25 1313   Calculated Wound Volume (cm^3) 0.01 cm^3 06/19/25 1313   Change in Wound Size % 80 06/19/25 1313   Number of underminings 1 06/19/25 1313   Undermining 1 0.2 06/19/25 1313   Undermining 1 is depth extending from 12-12 12 deepest 06/19/25 1313   Drainage Amount Scant 06/19/25 1313   Drainage Description Serosanguineous 06/19/25 1313   Viola-wound Assessment Callus;Rufino 06/12/25 1412   Dressing Status Intact 06/03/25 1333       Wound 06/19/25 Traumatic Abrasion Toe D2, second Right (Active)   Wound Image   06/19/25 1330   Wound Description Pink 06/19/25 1349   Non-staged Wound Description Partial thickness 06/19/25 1349   Wound Length (cm) 0.5 cm 06/19/25 1349   Wound Width (cm) 0.5 cm 06/19/25 1349   Wound Depth (cm) 0.1 cm 06/19/25 1349   Wound Surface Area (cm^2) 0.2 cm^2 06/19/25 1349   Wound Volume (cm^3) 0.013 cm^3 06/19/25 1349   Calculated Wound Volume (cm^3) 0.03 cm^3 06/19/25 1349   Drainage Amount  Scant 06/19/25 1349   Drainage Description Serous 06/19/25 1349   Viola-wound Assessment Dry;Intact 06/19/25 1349       Wound 06/19/25 Traumatic Abrasion Toe D3, third Right (Active)   Wound Image   06/19/25 1327   Wound Description Epithelialization;Pink 06/19/25 1349   Non-staged Wound Description Partial thickness 06/19/25 1349   Wound Length (cm) 0.5 cm 06/19/25 1349   Wound Width (cm) 0.5 cm 06/19/25 1349   Wound Depth (cm) 0.1 cm 06/19/25 1349   Wound Surface Area (cm^2) 0.2 cm^2 06/19/25 1349   Wound Volume (cm^3) 0.013 cm^3 06/19/25 1349   Calculated Wound Volume (cm^3) 0.03 cm^3 06/19/25 1349   Drainage Amount Scant 06/19/25 1349   Drainage Description Serous 06/19/25 1349   Viola-wound Assessment Intact 06/19/25 1349                           Cast Application    Date/Time: 6/19/2025 1:00 PM    Performed by: Anjali Titus DPM  Authorized by: Anjali Titus DPM    Other Assisting Provider: No    Verbal consent obtained?: Yes    Risks and benefits: Risks, benefits and alternatives were discussed    Consent given by:  Patient  Time Out:     Time out: Immediately prior to the procedure a time out was called    Patient states understanding of procedure being performed: Yes    Patient identity confirmed:  Verbally with patient  Pre-procedure details:     Sensation:  Unchanged  Procedure details:     Laterality:  Left    Location:  Leg    Leg:  L lower leg    Cast type:  Total contact    Supplies used: Acticoat, Allevyn foam, cast padding, total contact cast.  Post-procedure details:     Pain:  Unchanged    Sensation:  Unchanged    Patient tolerance of procedure:  Tolerated well, no immediate complications               Wound Instructions:  Orders Placed This Encounter   Procedures    Wound cleansing and dressings Left Toe D1, great     Wound cleansing and dressings Left Toe D1, great       Left great toe ulcer    Clean wound with mild soap and water pat dry  Apply Acticoat 7 to wound bed then apply foam  padding   TCC applied today by Dr. Titus      Apply silicone bordered foam for protection to lateral foot     Off-loading Instructions:     Total Contact Cast Instructions:   Do not get cast wet.   Contact wound center if there is a foul odor or becomes uncomfortable due to feeling tight or swelling.   Do not use objects down inside of cast to scratch.   Do not walk on cast without walking boot.     TCC printed instructions given to patient      Follow up in 1 week      ·     Standing Status:   Future     Expected Date:   6/26/2025     Expiration Date:   6/26/2025    Cast Application     This order was created via procedure documentation    Debridement Right Toe D1, great     This order was created via procedure documentation    Wound cleansing and dressings Right Toe D1, great     Wound cleansing and dressings Right Toe D1, great     Right Great Toes       Wash your hands with soap and water.  Remove old dressing, discard into plastic bag and place in trash.  Cleanse the wound with mild soap and water prior to applying a clean dressing. Do not use tissue or cotton balls. Do not scrub the wound. Pat dry using gauze.  Shower no   May purchase cast covers.      Apply puracol ag to the right great toe wound.  Cover with gauze.  Secure with tape.  Change dressing every other day      Continue to wear off-loading devices  Off-loading Instructions: Surgical Shoes with off-loading pads     Keep weight and pressure off wound at all times. Try to walk with pressure on the heels.  Wear off-loading device as directed by your physician. Put on immediately when rising in the morning and remove when going to bed.      Please try to consume 3-4 servings of protein (30g) each day.   - Each serving of protein should contain about 30 mg protein.   - Good sources of protein include, lean meats (fish, chicken, etc), eggs, dairy products (yogurt, cheese), tofu, legumes (chickpeas, lentils, peas, black beans), nuts (cashew, walnut,  "peanuts, etc), & quinoa.        If you develop fever, chills, nausea or vomiting, increase in drainage or foul smelling drainage go to the closest emergency department for evaluation.    Wound cleansing and dressings     Wash 2 nd and third toes on right foot with soap and water   Apply dressing dressing daily     Standing Status:   Future     Expected Date:   6/26/2025     Expiration Date:   6/26/2025    Wound Procedure Treatment     This order was created via procedure documentation    Wound Procedure Treatment Right Toe D1, great     This order was created via procedure documentation    Wound Procedure Treatment Left Toe D1, great     This order was created via procedure documentation         Anjali Titus DPM      Portions of the record may have been created with voice recognition software. Occasional wrong word or \"sound a like\" substitutions may have occurred due to the inherent limitations of voice recognition software. Read the chart carefully and recognize, using context, where substitutions have occurred.            [1]   Patient Active Problem List  Diagnosis    Hyponatremia    Diabetic foot infection  (HCC)    Opioid abuse (HCC)    Diabetes mellitus (HCC)    Class 1 obesity due to excess calories in adult   [2]   Past Medical History:  Diagnosis Date    Class 1 obesity in adult     Diabetes mellitus (HCC)     Opioid abuse (HCC)    [3]   Past Surgical History:  Procedure Laterality Date    NO PAST SURGERIES N/A    [4]   Social History  Socioeconomic History    Marital status: Unknown   Tobacco Use    Smoking status: Every Day    Smokeless tobacco: Never   Substance and Sexual Activity    Alcohol use: Not Currently    Drug use: Not Currently     Types: Fentanyl     Social Drivers of Health     Financial Resource Strain: Not At Risk (4/3/2025)    Received from [facility]    Financial Insecurity     In the last 12 months did you skip medications to save money?: No     In the last 12 " months was there a time when you needed to see a doctor but could not because of cost?: No   Food Insecurity: No Food Insecurity (4/3/2025)    Received from [facility]    Food Insecurity     In the last 12 months did you ever eat less than you felt you should because there wasn't enough money for food?: No   Transportation Needs: No Transportation Needs (4/3/2025)    Received from [facility]    Transportation Needs     In the last 12 months have you ever had to go without healthcare because you didn't have a way to get there?: No   Social Connections: Socially Integrated (4/3/2025)    Received from [facility]    Social Connection     Do you often feel lonely?: No   Housing Stability: Not At Risk (4/3/2025)    Received from [facility]    Housing Stability     Are you worried that in the next 2 months you may not have stable housing?: No   [5]   Current Outpatient Medications:     atorvastatin (LIPITOR) 10 mg tablet, Take by mouth, Disp: , Rfl:     cloNIDine (CATAPRES) 0.3 mg tablet, Take 0.3 mg by mouth 2 (two) times a day, Disp: , Rfl:     Empagliflozin (JARDIANCE) 10 MG TABS tablet, Take 25 mg by mouth daily, Disp: , Rfl:     gabapentin (NEURONTIN) 400 mg capsule, Take by mouth, Disp: , Rfl:     insulin lispro (HumaLOG) 100 units/mL injection pen, Inject under the skin, Disp: , Rfl:     Jardiance 25 MG TABS, Take by mouth, Disp: , Rfl:     Lantus SoloStar 100 units/mL SOPN, Inject 70 Units under the skin daily, Disp: , Rfl:     lisinopril (ZESTRIL) 20 mg tablet, Take 20 mg by mouth, Disp: , Rfl:     metFORMIN (GLUCOPHAGE) 1000 MG tablet, Take by mouth, Disp: , Rfl:     pantoprazole (PROTONIX) 40 mg tablet, Take by mouth, Disp: , Rfl:     Sublocade 300 MG/1.5ML, Inject under the skin, Disp: , Rfl:   [6]   Family History  Problem Relation Name Age of Onset    Diabetes Mother

## 2025-06-19 NOTE — PROGRESS NOTES
Wound Procedure Treatment Left Toe D1, great    Performed by: Amanda Casanova RN  Authorized by: Anjali Titus DPM  Associated wounds:   Wound 05/13/25 Diabetic Ulcer Toe D1, great Left    Wound cleansed with:  Soap and water   Applied primary dressing:  Acticoat and Foam   Applied secondary dressing:  Gauze   Dressing secured with:  Tape   Offloading device appllied:  TCC

## 2025-06-26 ENCOUNTER — OFFICE VISIT (OUTPATIENT)
Dept: WOUND CARE | Facility: CLINIC | Age: 45
End: 2025-06-26
Payer: COMMERCIAL

## 2025-06-26 VITALS
SYSTOLIC BLOOD PRESSURE: 120 MMHG | HEART RATE: 80 BPM | RESPIRATION RATE: 18 BRPM | DIASTOLIC BLOOD PRESSURE: 70 MMHG | TEMPERATURE: 96.8 F

## 2025-06-26 DIAGNOSIS — E11.621 DIABETIC ULCER OF RIGHT GREAT TOE (HCC): ICD-10-CM

## 2025-06-26 DIAGNOSIS — E11.621 DIABETIC ULCER OF LEFT GREAT TOE (HCC): Primary | ICD-10-CM

## 2025-06-26 DIAGNOSIS — L97.529 DIABETIC ULCER OF LEFT GREAT TOE (HCC): Primary | ICD-10-CM

## 2025-06-26 DIAGNOSIS — L97.519 DIABETIC ULCER OF RIGHT GREAT TOE (HCC): ICD-10-CM

## 2025-06-26 PROCEDURE — 29445 APPL RIGID TOT CNTC LEG CAST: CPT | Performed by: PODIATRIST

## 2025-06-26 PROCEDURE — 11042 DBRDMT SUBQ TIS 1ST 20SQCM/<: CPT | Performed by: PODIATRIST

## 2025-06-26 RX ORDER — LIDOCAINE 40 MG/G
CREAM TOPICAL ONCE
Status: COMPLETED | OUTPATIENT
Start: 2025-06-26 | End: 2025-06-26

## 2025-06-26 RX ADMIN — LIDOCAINE: 40 CREAM TOPICAL at 13:47

## 2025-06-26 NOTE — PROGRESS NOTES
Wound Procedure Treatment Right Toe D1, great    Performed by: Amanda Casanova RN  Authorized by: Anjali Titus DPM  Associated wounds:   Wound 05/13/25 Diabetic Ulcer Toe D1, great Right    Wound cleansed with:  Soap and water   Applied primary dressing:  Silver and Collagen dressing   Applied secondary dressing:  Gauze   Dressing secured with:  Tape   Offloading device appllied:  Surgical shoe

## 2025-06-26 NOTE — PROGRESS NOTES
Wound Procedure Treatment Left Toe D1, great    Performed by: Amanda Casanova RN  Authorized by: Anjali Titus DPM  Associated wounds:   Wound 05/13/25 Diabetic Ulcer Toe D1, great Left    Wound cleansed with:  Soap and water   Applied primary dressing:  Acticoat and Silicone bordered foam   Applied secondary dressing:  Foam   Dressing secured with:  Tape   Offloading device appllied:  TCC   Comments:  7

## 2025-06-26 NOTE — PATIENT INSTRUCTIONS
Orders Placed This Encounter   Procedures    Wound cleansing and dressings Left Toe D1, great     Wound cleansing and dressings Left Toe D1, great       Wound cleansing and dressings Left Toe D1, great                            Left great toe ulcer    Clean wound with mild soap and water pat dry  Apply Acticoat 7 to wound bed then apply foam padding   TCC applied today by Dr. Titus      Apply silicone bordered foam for protection to lateral foot     Off-loading Instructions:     Total Contact Cast Instructions:   Do not get cast wet.   Contact wound center if there is a foul odor or becomes uncomfortable due to feeling tight or swelling.   Do not use objects down inside of cast to scratch.   Do not walk on cast without walking boot.     TCC printed instructions given to patient      Follow up in 1 week                          ·      T     Standing Status:   Future     Expected Date:   7/3/2025     Expiration Date:   7/3/2025    Wound cleansing and dressings Right Toe D1, great     Wound cleansing and dressings Right Toe D1, great        Wash your hands with soap and water.  Remove old dressing, discard into plastic bag and place in trash.  Cleanse the wound with mild soap and water prior to applying a clean dressing. Do not use tissue or cotton balls. Do not scrub the wound. Pat dry using gauze.  Shower no   May purchase cast covers.      Apply puracol ag to the right great toe wound.  Cover with gauze.  Secure with tape.  Change dressing every other day      Continue to wear off-loading devices  Off-loading Instructions: Surgical Shoes with off-loading pads     Keep weight and pressure off wound at all times. Try to walk with pressure on the heels.  Wear off-loading device as directed by your physician. Put on immediately when rising in the morning and remove when going to bed.      Please try to consume 3-4 servings of protein (30g) each day.   - Each serving of protein should contain about 30 mg protein.   -  Good sources of protein include, lean meats (fish, chicken, etc), eggs, dairy products (yogurt, cheese), tofu, legumes (chickpeas, lentils, peas, black beans), nuts (cashew, walnut, peanuts, etc), & quinoa.        If you develop fever, chills, nausea or vomiting, increase in drainage or foul smelling drainage go to the closest emergency department for evaluation.  · Wound cleansing and dressings     Standing Status:   Future     Expiration Date:   7/3/2025    Cast Application     This order was created via procedure documentation    Debridement     This order was created via procedure documentation    Wound Procedure Treatment Right Toe D1, great     This order was created via procedure documentation    Wound Procedure Treatment Left Toe D1, great     This order was created via procedure documentation

## 2025-06-26 NOTE — PROGRESS NOTES
"Debridement     Date/Time: 6/26/2025 1:00 PM    Universal Protocol:  procedure performed by consultantConsent: Verbal consent obtained  Risks and benefits: risks, benefits and alternatives were discussed  Consent given by: patient  Time out: Immediately prior to procedure a \"time out\" was called to verify the correct patient, procedure, equipment, support staff and site/side marked as required.  Patient understanding: patient states understanding of the procedure being performed  Patient identity confirmed: verbally with patient    Debridement Details  Performed by: physician  Debridement type: surgical  Level of debridement: subcutaneous tissue  Pain control: lidocaine 4%    Post-debridement measurements  Length (cm): 0.5  Width (cm): 1.1  Depth (cm): 0.3  Percent debrided: 100%  Surface Area (cm^2): 0.43  Area Debrided (cm^2): 0.43  Volume (cm^3): 0.09    Tissue and other material debrided: subcutaneous tissue  Devitalized tissue debrided: callus  Instrument(s) utilized: curette  Technique utilized: nonexcisional  Bleeding: small  Hemostasis obtained with: pressure  Procedural pain (0-10): 0  Post-procedural pain: 0   Response to treatment: procedure was tolerated well  100    "

## 2025-07-03 ENCOUNTER — OFFICE VISIT (OUTPATIENT)
Dept: WOUND CARE | Facility: CLINIC | Age: 45
End: 2025-07-03
Payer: COMMERCIAL

## 2025-07-03 VITALS
HEART RATE: 80 BPM | TEMPERATURE: 97.4 F | DIASTOLIC BLOOD PRESSURE: 80 MMHG | SYSTOLIC BLOOD PRESSURE: 130 MMHG | RESPIRATION RATE: 18 BRPM

## 2025-07-03 DIAGNOSIS — E11.621 DIABETIC ULCER OF RIGHT GREAT TOE (HCC): Primary | ICD-10-CM

## 2025-07-03 DIAGNOSIS — L97.529 DIABETIC ULCER OF LEFT GREAT TOE (HCC): ICD-10-CM

## 2025-07-03 DIAGNOSIS — L97.519 DIABETIC ULCER OF RIGHT GREAT TOE (HCC): Primary | ICD-10-CM

## 2025-07-03 DIAGNOSIS — E11.621 DIABETIC ULCER OF LEFT GREAT TOE (HCC): ICD-10-CM

## 2025-07-03 PROCEDURE — 97597 DBRDMT OPN WND 1ST 20 CM/<: CPT | Performed by: PODIATRIST

## 2025-07-03 RX ORDER — LIDOCAINE 40 MG/G
CREAM TOPICAL ONCE
Status: COMPLETED | OUTPATIENT
Start: 2025-07-03 | End: 2025-07-03

## 2025-07-03 RX ADMIN — LIDOCAINE: 40 CREAM TOPICAL at 13:43

## 2025-07-03 NOTE — PROGRESS NOTES
Wound Procedure Treatment Right Toe D1, great    Performed by: Amanda Casanova RN  Authorized by: Anjali Titus DPM  Associated wounds:   Wound 05/13/25 Diabetic Ulcer Toe D1, great Right    Wound cleansed with:  NSS   Applied primary dressing:  Dermagran   Applied secondary dressing:  Gauze   Dressing secured with:  Tape   Offloading device appllied:  Surgical shoe

## 2025-07-03 NOTE — PATIENT INSTRUCTIONS
Orders Placed This Encounter   Procedures    Wound cleansing and dressings     Wound cleansing and dressings Left Toe D1, toe healed, massage with Vaseline to area daily and wear surgical shoe for then next week       Wound cleansing and dressings Right Toe D1, great                  Wash your hands with soap and water.  Remove old dressing, discard into plastic bag and place in trash.  Cleanse the wound with mild soap and water prior to applying a clean dressing. Do not use tissue or cotton balls. Do not scrub the wound. Pat dry using gauze.  Shower yes      Apply Dermagran to the right great toe wound.  Cover with gauze.  Secure with tape.  Change dressing every day      Continue to wear off-loading devices  Off-loading Instructions: Surgical Shoes with off-loading pads     Keep weight and pressure off wound at all times. Try to walk with pressure on the heels.  Wear off-loading device as directed by your physician. Put on immediately when rising in the morning and remove when going to bed.      Please try to consume 3-4 servings of protein (30g) each day.   - Each serving of protein should contain about 30 mg protein.   - Good sources of protein include, lean meats (fish, chicken, etc), eggs, dairy products (yogurt, cheese), tofu, legumes (chickpeas, lentils, peas, black beans), nuts (cashew, walnut, peanuts, etc), & quinoa.        If you develop fever, chills, nausea or vomiting, increase in drainage or foul smelling drainage go to the closest emergency department for evaluation.  Follow up July 17     Standing Status:   Future     Expiration Date:   7/10/2025

## 2025-07-03 NOTE — PROGRESS NOTES
Wound Procedure Treatment Left Toe D1, great    Performed by: Amanda Casanova RN  Authorized by: Anjali Titus DPM  Associated wounds:   Wound 05/13/25 Diabetic Ulcer Toe D1, great Left    Wound cleansed with:  NSS   Applied to periwound:  Other   Offloading device appllied:  Surgical shoe   Comments:  Vaseline

## 2025-07-03 NOTE — PROGRESS NOTES
Patient ID: Beau Daly is a 44 y.o. male Date of Birth 1980       Chief Complaint   Patient presents with    Follow Up Wound Care Visit     Wounds bilateral great toes       Allergies:  Patient has no known allergies.    Diagnosis:  1. Diabetic ulcer of right great toe (HCC)  -     Wound cleansing and dressings; Future  -     lidocaine (LMX) 4 % cream  -     Wound Procedure Treatment Right Toe D1, great  2. Diabetic ulcer of left great toe (HCC)  -     Wound Procedure Treatment Left Toe D1, great     Diagnosis ICD-10-CM Associated Orders   1. Diabetic ulcer of right great toe (HCC)  E11.621 Wound cleansing and dressings    L97.519 lidocaine (LMX) 4 % cream     Wound Procedure Treatment Right Toe D1, great      2. Diabetic ulcer of left great toe (HCC)  E11.621 Wound Procedure Treatment Left Toe D1, great    L97.529            Assessment & Plan:  See wound orders.     Right foot Dermagran  Left foot healed     -RLE GT plantar diabetic ulceration.     - Smaller at today's evaluation, selective debridement performed              - Continue with specialized offloading surgical shoe and dressing changes.  Surgical shoe further offloaded   - Change dressing to Dermagran, DSD    - Left  great toe plantar diabetic ulceration healed at today's evaluation   -Counseled patient at length on Vaseline scar massage   -Patient advised to continue with surgical shoe specially modified with offloading for at least 1 week.  Diabetic shoes modified with further offloading he will slowly transition to them after 1 week   -Check feet daily    -Counseled that he is at a high risk of wound recurrence due to chronic wounds in the area and biomechanics causing significant pressure to the area.  Reviewed need for continued offloading in shoes and patient advised he cannot walk barefoot  -Diabetic insoles modified at today's visit in preparation for future use.  Offloading modifications made  -Surgical shoes refreshed with new  offloading felt  -Smoking cessation reviewed again at today's evaluation  - Patient counseled on the need for continued strict glycemic control.  A1c 8% 4/30/2025.  A1c 6/10/2025 6.5% [facility]  - Patient counseled that he is at a high risk for further infection and osteomyelitis.  This may result in need for surgical intervention such as amputation  - Patient counseled on worsening signs of infection and will present to the ED if these occur     - Follow-up 2 weeks for reevaluation of right great toe ulceration    Subjective:   HPI  Presents for evaluation and management of bilateral great toe diabetic ulcerations, chronic in nature.  Patient states he tolerated left lower extremity TCC cast well.  No falls no pain, no irritation.  Patient has decreased his activity overall.  He has been wearing the appropriate offloading surgical shoe on the right lower extremity.  Patient denies any local or systemic signs of infection  He brings with him his diabetic shoes for offloading modification      The following portions of the patient's history were reviewed and updated as appropriate:   Problem List[1]  Past Medical History[2]  Past Surgical History[3]  Social History[4]   Current Medications[5]  Family History[6]   Review of Systems  Constitutional:  Negative for chills and fever.   Respiratory:  Negative for chest tightness and shortness of breath.    Cardiovascular:  Negative for leg swelling.   Musculoskeletal:  Positive for arthralgias.   Skin:  Positive for wound.    Objective:  /80   Pulse 80   Temp (!) 97.4 °F (36.3 °C)   Resp 18     Physical Exam  Constitutional:       General: He is not in acute distress.     Appearance: He is not ill-appearing.   Cardiovascular:      Comments: DP pulse 2/4, PT pulse nonpalpable  CRT to distal digits less than 3 seconds  Skin temperature gradient decreased from knees to digits bilaterally  Absent pedal hair growth  Hemosiderin deposits noted to bilateral lower extremities.   No significant edema noted  Musculoskeletal:      Comments: Mild pes planus bilaterally  Hallux rigidus noted to bilateral first MPJs with palpable bony prominences dorsal medial aspect of bilateral MPJs  MMT 5/5 at level of ankle.  No pain with palpation of posterior calves   Skin:     Capillary Refill: Capillary refill takes less than 2 seconds.      Comments:   Left great toe plantar IPJ diabetic ulceration healed.  Scar tissue evident with some hyperkeratotic tissue noted.  No surrounding erythema or edema     Right right great toe plantar IPJ ulceration.  Fibrogranular base.  Hyperkeratotic periwound.  No erythema or edema, no drainage, no purulence, no crepitus or fluctuance    Wound 05/13/25 Diabetic Ulcer Toe D1, great Right (Active)   Wound Image   07/03/25 1321   Enter Castillo score: Castillo Grade 1: Partial or full-thickness ulcer (superficial) 07/03/25 1323   Wound Description Pink 07/03/25 1323   Non-staged Wound Description Full thickness 07/03/25 1323   Wound Length (cm) 0.1 cm 07/03/25 1323   Wound Width (cm) 0.5 cm 07/03/25 1323   Wound Depth (cm) 0.1 cm 07/03/25 1323   Wound Surface Area (cm^2) 0.04 cm^2 07/03/25 1323   Wound Volume (cm^3) 0.003 cm^3 07/03/25 1323   Calculated Wound Volume (cm^3) 0.01 cm^3 07/03/25 1323   Change in Wound Size % 50 07/03/25 1323   Number of underminings 1 06/26/25 1300   Undermining 1 0.2 06/26/25 1300   Undermining 1 is depth extending from 11-3  deepest at 12 06/26/25 1300   Drainage Amount Scant 07/03/25 1323   Drainage Description Bloody 07/03/25 1323   Viola-wound Assessment Callus;Brown 07/03/25 1323   Dressing Status Intact 06/03/25 1330       Wound 05/13/25 Diabetic Ulcer Toe D1, great Left (Active)   Wound Image   07/03/25 1319   Enter Castillo score: Castillo Grade 1: Partial or full-thickness ulcer (superficial) 07/03/25 1323   Wound Description Epithelialization 07/03/25 1323   Non-staged Wound Description Full thickness 06/26/25 1303   Wound Length (cm) 0 cm  "07/03/25 1323   Wound Width (cm) 0 cm 07/03/25 1323   Wound Depth (cm) 0 cm 07/03/25 1323   Wound Surface Area (cm^2) 0 cm^2 07/03/25 1323   Wound Volume (cm^3) 0 cm^3 07/03/25 1323   Calculated Wound Volume (cm^3) 0 cm^3 07/03/25 1323   Change in Wound Size % 100 07/03/25 1323   Number of underminings 1 06/19/25 1313   Undermining 1 0.2 06/19/25 1313   Undermining 1 is depth extending from 12-12 12 deepest 06/19/25 1313   Drainage Amount None 07/03/25 1323   Drainage Description Serosanguineous 06/26/25 1303   Viola-wound Assessment Callus 07/03/25 1323   Dressing Status Intact 06/03/25 1333                   Debridement     Date/Time: 7/3/2025 1:00 PM    Universal Protocol:  procedure performed by consultantConsent: Verbal consent obtained  Risks and benefits: risks, benefits and alternatives were discussed  Consent given by: patient  Time out: Immediately prior to procedure a \"time out\" was called to verify the correct patient, procedure, equipment, support staff and site/side marked as required.  Patient understanding: patient states understanding of the procedure being performed  Patient identity confirmed: verbally with patient    Debridement Details  Performed by: physician  Debridement type: selective  Pain control: lidocaine 4%    Post-debridement measurements  Length (cm): 0.1  Width (cm): 0.5  Depth (cm): 0.1  Percent debrided: 100%  Surface Area (cm^2): 0.04  Area Debrided (cm^2): 0.04  Volume (cm^3): 0    Devitalized tissue debrided: callus and fibrin  Instrument(s) utilized: curette  Technique utilized: nonexcisional  Bleeding: small  Hemostasis obtained with: pressure  Procedural pain (0-10): 0  Post-procedural pain: 0   Response to treatment: procedure was tolerated well                 Wound Instructions:  Orders Placed This Encounter   Procedures    Wound cleansing and dressings     Wound cleansing and dressings Left Toe D1, toe healed, massage with Vaseline to area daily and wear surgical shoe for " "then next week       Wound cleansing and dressings Right Toe D1, great                  Wash your hands with soap and water.  Remove old dressing, discard into plastic bag and place in trash.  Cleanse the wound with mild soap and water prior to applying a clean dressing. Do not use tissue or cotton balls. Do not scrub the wound. Pat dry using gauze.  Shower yes      Apply Dermagran to the right great toe wound.  Cover with gauze.  Secure with tape.  Change dressing every day      Continue to wear off-loading devices  Off-loading Instructions: Surgical Shoes with off-loading pads     Keep weight and pressure off wound at all times. Try to walk with pressure on the heels.  Wear off-loading device as directed by your physician. Put on immediately when rising in the morning and remove when going to bed.      Please try to consume 3-4 servings of protein (30g) each day.   - Each serving of protein should contain about 30 mg protein.   - Good sources of protein include, lean meats (fish, chicken, etc), eggs, dairy products (yogurt, cheese), tofu, legumes (chickpeas, lentils, peas, black beans), nuts (cashew, walnut, peanuts, etc), & quinoa.        If you develop fever, chills, nausea or vomiting, increase in drainage or foul smelling drainage go to the closest emergency department for evaluation.  Follow up July 17     Standing Status:   Future     Expiration Date:   7/10/2025    Wound Procedure Treatment Right Toe D1, great     This order was created via procedure documentation    Wound Procedure Treatment Left Toe D1, great     This order was created via procedure documentation    Debridement     This order was created via procedure documentation         Anjali Titus DPM      Portions of the record may have been created with voice recognition software. Occasional wrong word or \"sound a like\" substitutions may have occurred due to the inherent limitations of voice recognition software. Read the chart carefully and " recognize, using context, where substitutions have occurred.            [1]   Patient Active Problem List  Diagnosis    Hyponatremia    Diabetic foot infection  (HCC)    Opioid abuse (HCC)    Diabetes mellitus (HCC)    Class 1 obesity due to excess calories in adult   [2]   Past Medical History:  Diagnosis Date    Class 1 obesity in adult     Diabetes mellitus (HCC)     Opioid abuse (HCC)    [3]   Past Surgical History:  Procedure Laterality Date    NO PAST SURGERIES N/A    [4]   Social History  Socioeconomic History    Marital status: Unknown   Tobacco Use    Smoking status: Every Day    Smokeless tobacco: Never   Substance and Sexual Activity    Alcohol use: Not Currently    Drug use: Not Currently     Types: Fentanyl     Social Drivers of Health     Financial Resource Strain: Not At Risk (4/3/2025)    Received from [facility]    Financial Insecurity     In the last 12 months did you skip medications to save money?: No     In the last 12 months was there a time when you needed to see a doctor but could not because of cost?: No   Food Insecurity: No Food Insecurity (4/3/2025)    Received from [facility]    Food Insecurity     In the last 12 months did you ever eat less than you felt you should because there wasn't enough money for food?: No   Transportation Needs: No Transportation Needs (4/3/2025)    Received from [facility]    Transportation Needs     In the last 12 months have you ever had to go without healthcare because you didn't have a way to get there?: No   Social Connections: Socially Integrated (4/3/2025)    Received from [facility]    Social Connection     Do you often feel lonely?: No   Housing Stability: Not At Risk (4/3/2025)    Received from [facility]    Housing Stability     Are you worried that in the next 2 months you may not have stable housing?: No   [5]   Current Outpatient Medications:     atorvastatin  (LIPITOR) 10 mg tablet, Take by mouth, Disp: , Rfl:     cloNIDine (CATAPRES) 0.3 mg tablet, Take 0.3 mg by mouth 2 (two) times a day, Disp: , Rfl:     Empagliflozin (JARDIANCE) 10 MG TABS tablet, Take 25 mg by mouth daily, Disp: , Rfl:     gabapentin (NEURONTIN) 400 mg capsule, Take by mouth, Disp: , Rfl:     insulin lispro (HumaLOG) 100 units/mL injection pen, Inject under the skin, Disp: , Rfl:     Jardiance 25 MG TABS, Take by mouth, Disp: , Rfl:     Lantus SoloStar 100 units/mL SOPN, Inject 70 Units under the skin daily, Disp: , Rfl:     lisinopril (ZESTRIL) 20 mg tablet, Take 20 mg by mouth, Disp: , Rfl:     metFORMIN (GLUCOPHAGE) 1000 MG tablet, Take by mouth, Disp: , Rfl:     pantoprazole (PROTONIX) 40 mg tablet, Take by mouth, Disp: , Rfl:     Sublocade 300 MG/1.5ML, Inject under the skin, Disp: , Rfl:   No current facility-administered medications for this visit.  [6]   Family History  Problem Relation Name Age of Onset    Diabetes Mother

## 2025-07-17 ENCOUNTER — OFFICE VISIT (OUTPATIENT)
Dept: WOUND CARE | Facility: CLINIC | Age: 45
End: 2025-07-17
Payer: COMMERCIAL

## 2025-07-17 VITALS
TEMPERATURE: 97.8 F | RESPIRATION RATE: 18 BRPM | DIASTOLIC BLOOD PRESSURE: 80 MMHG | HEART RATE: 98 BPM | SYSTOLIC BLOOD PRESSURE: 150 MMHG

## 2025-07-17 DIAGNOSIS — E11.621 DIABETIC ULCER OF RIGHT GREAT TOE (HCC): Primary | ICD-10-CM

## 2025-07-17 DIAGNOSIS — L97.519 DIABETIC ULCER OF RIGHT GREAT TOE (HCC): Primary | ICD-10-CM

## 2025-07-17 PROCEDURE — 97597 DBRDMT OPN WND 1ST 20 CM/<: CPT | Performed by: PODIATRIST

## 2025-07-17 NOTE — PATIENT INSTRUCTIONS
Orders Placed This Encounter   Procedures    Wound cleansing and dressings Right Toe D1, great     Wound cleansing and dressings     Left Toe D1, toe healed, continue to massage with Vaseline to area daily and wear surgical shoe for then next week            Wound cleansing and dressings Right Toe D1, great                  Wash your hands with soap and water.  Remove old dressing, discard into plastic bag and place in trash.  Cleanse the wound with mild soap and water prior to applying a clean dressing. Do not use tissue or cotton balls. Do not scrub the wound. Pat dry using gauze.  Shower yes      Apply Dermagran to the right great toe wound.  Cover with gauze.  Secure with tape.  Change dressing every day      Continue to wear off-loading devices  Off-loading Instructions: Surgical Shoes with off-loading pads     Keep weight and pressure off wound at all times. Try to walk with pressure on the heels.  Wear off-loading device as directed by your physician. Put on immediately when rising in the morning and remove when going to bed.      Please try to consume 3-4 servings of protein (30g) each day.   - Each serving of protein should contain about 30 mg protein.   - Good sources of protein include, lean meats (fish, chicken, etc), eggs, dairy products (yogurt, cheese), tofu, legumes (chickpeas, lentils, peas, black beans), nuts (cashew, walnut, peanuts, etc), & quinoa.        If you develop fever, chills, nausea or vomiting, increase in drainage or foul smelling drainage go to the closest emergency department for evaluation  Follow up in 1 week     Standing Status:   Future     Expected Date:   7/31/2025     Expiration Date:   7/31/2025

## 2025-07-17 NOTE — PROGRESS NOTES
Name: Beau Daly      : 1980      MRN: [id number]  Encounter Provider: Anjali Titus DPM  Encounter Date: 2025   Encounter department: [facility] WOUND CARE  :  Assessment & Plan  Diabetic ulcer of right great toe (HCC)    Lab Results   Component Value Date    HGBA1C 8 (H) 2025     Right foot Dermagran  Left foot healed     -RLE GT plantar diabetic ulceration.                - Increase in size at today's evaluation as patient was noncompliant with offloading and created friction blistering over wound.  Debrided without SOI              - Continue with specialized offloading surgical shoe and dressing changes.  Surgical shoe further offloaded              - Continue  CASPER [place]   - Patient working on transportation so he could be eligible for TCC cast to right lower extremity in the future     - Left  great toe plantar diabetic ulceration remained healed however extensive hyperkeratotic buildup noted due to noncompliance with offloading shoes              -Counseled patient at length on Vaseline scar massage              -Patient advised to continue with surgical shoe with offloading modification at this time              -Check feet daily    -Reviewed patient may benefit from first MPJ surgical intervention in the future to increase range of motion at the level of the joint and to prevent biomechanical pressure points.  Patient would need better glycemic control as well as smoking cessation.  Patient would also need reevaluation to rule out any underlying infection.  -Counseled that he is at a high risk of wound recurrence due to chronic wounds in the area and biomechanics causing significant pressure to the area.  Reviewed need for continued offloading in shoes and patient advised he cannot walk barefoot or in regular shoes without offloading  -Diabetic insoles modified at today's visit in preparation for future use.  Offloading modifications made  -Surgical  shoes refreshed with new offloading felt  -Smoking cessation reviewed again at today's evaluation  - Patient counseled on the need for continued strict glycemic control.  A1c 8% 4/30/2025.  A1c 6/10/2025 6.5% [facility]  - Patient counseled that he is at a high risk for further infection and osteomyelitis.  This may result in need for surgical intervention such as amputation  - Patient counseled on worsening signs of infection and will present to the ED if these occur     - Follow-up 2 weeks for reevaluation of right great toe ulceration  Orders:    Wound cleansing and dressings Right Toe D1, great; Future    Wound Procedure Treatment Right Toe D1, great    Debridement Right Toe D1, great        History of Present Illness   Chief Complaint   Patient presents with    Follow Up Wound Care Visit     Wound right great toe   Here for wound follow up.  HPI  Patient presents for evaluation and management of right great toe plantar diabetic ulceration.  Patient states he was wearing his surgical shoe however he has been also been wearing his crocs since he has been doing yard work.  He admits that he has not been wearing offloading shoes as he does not want to get them dirty when he is completing yard work and power washing.  Patient has been adherent to dressing changes.  Patient has been doing Vaseline scar massage to left great toe.  He has noticed callusing in the area of the prior wound but he denies any drainage or open wound.  Patient denies any systemic or local signs of infection      Objective   /80   Pulse 98   Temp 97.8 °F (36.6 °C)   Resp 18     Physical Exam  Constitutional:       General: He is not in acute distress.     Appearance: He is not ill-appearing.   Cardiovascular:      Comments: DP pulse 2/4, PT pulse nonpalpable  CRT to distal digits less than 3 seconds  Skin temperature gradient decreased from knees to digits bilaterally  Absent pedal hair growth  Hemosiderin deposits noted to bilateral lower  extremities.  No significant edema noted  Musculoskeletal:      Comments: Mild pes planus bilaterally  Hallux rigidus noted to bilateral first MPJs with palpable bony prominences dorsal medial aspect of bilateral MPJs  MMT 5/5 at level of ankle.  No pain with palpation of posterior calves   Skin:     Capillary Refill: Capillary refill takes less than 2 seconds.      Comments:   Left great toe plantar IPJ diabetic ulceration healed.  Significant hyperkeratotic tissue buildup.  No surrounding erythema or edema     Right great toe plantar IPJ ulceration.  Fibrogranular base.  Overlying friction blister due to hyperkeratotic periwound.  No erythema or edema, no drainage, no purulence, no crepitus or fluctuance    Wound 05/13/25 Diabetic Ulcer Toe D1, great Right (Active)   Wound Image   07/17/25 1325   Enter Castillo score: Castillo Grade 1: Partial or full-thickness ulcer (superficial) 07/17/25 1327   Wound Description Pink;Granulation tissue;Slough 07/17/25 1327   Non-staged Wound Description Full thickness 07/17/25 1327   Wound Length (cm) 0.2 cm 07/17/25 1327   Wound Width (cm) 1.9 cm 07/17/25 1327   Wound Depth (cm) 0.4 cm 07/17/25 1327   Wound Surface Area (cm^2) 0.3 cm^2 07/17/25 1327   Wound Volume (cm^3) 0.08 cm^3 07/17/25 1327   Calculated Wound Volume (cm^3) 0.15 cm^3 07/17/25 1327   Change in Wound Size % -650 07/17/25 1327   Number of underminings 1 07/17/25 1327   Undermining 1 0.4 07/17/25 1327   Undermining 1 is depth extending from 3-10  deepest 4 07/17/25 1327   Drainage Amount Small 07/17/25 1327   Drainage Description Serosanguineous 07/17/25 1327   Viola-wound Assessment Callus 07/17/25 1327   Dressing Status Intact 06/03/25 1330       Debridement   Wound 05/13/25 Diabetic Ulcer Toe D1, great Right     Date/Time: 7/17/2025 1:15 PM    Universal Protocol:  procedure performed by consultantConsent: Verbal consent obtained  Risks and benefits: risks, benefits and alternatives were discussed  Consent given  "by: patient  Time out: Immediately prior to procedure a \"time out\" was called to verify the correct patient, procedure, equipment, support staff and site/side marked as required.  Patient understanding: patient states understanding of the procedure being performed  Patient identity confirmed: verbally with patient    Debridement Details  Performed by: physician  Debridement type: selective  Pain control: lidocaine 4%    Post-debridement measurements  Length (cm): 0.2  Width (cm): 1.9  Depth (cm): 0.4  Percent debrided: 100%  Surface Area (cm^2): 0.3  Area Debrided (cm^2): 0.3  Volume (cm^3): 0.08    Devitalized tissue debrided: callus and fibrin  Instrument(s) utilized: forceps and blade  Technique utilized: excisional  Bleeding: small  Hemostasis obtained with: pressure  Procedural pain (0-10): 0  Post-procedural pain: 0   Response to treatment: procedure was tolerated well               "

## 2025-07-22 ENCOUNTER — OFFICE VISIT (OUTPATIENT)
Dept: WOUND CARE | Facility: CLINIC | Age: 45
End: 2025-07-22
Payer: COMMERCIAL

## 2025-07-22 ENCOUNTER — APPOINTMENT (OUTPATIENT)
Dept: RADIOLOGY | Facility: MEDICAL CENTER | Age: 45
End: 2025-07-22
Payer: COMMERCIAL

## 2025-07-22 VITALS
DIASTOLIC BLOOD PRESSURE: 80 MMHG | TEMPERATURE: 96 F | HEART RATE: 88 BPM | RESPIRATION RATE: 18 BRPM | SYSTOLIC BLOOD PRESSURE: 140 MMHG

## 2025-07-22 DIAGNOSIS — E11.621 DIABETIC ULCER OF LEFT GREAT TOE (HCC): Primary | ICD-10-CM

## 2025-07-22 DIAGNOSIS — L97.519 DIABETIC ULCER OF RIGHT GREAT TOE (HCC): ICD-10-CM

## 2025-07-22 DIAGNOSIS — E11.621 DIABETIC ULCER OF RIGHT GREAT TOE (HCC): ICD-10-CM

## 2025-07-22 DIAGNOSIS — L97.529 DIABETIC ULCER OF LEFT GREAT TOE (HCC): Primary | ICD-10-CM

## 2025-07-22 DIAGNOSIS — M79.671 PAIN IN RIGHT FOOT: Primary | ICD-10-CM

## 2025-07-22 PROCEDURE — 11042 DBRDMT SUBQ TIS 1ST 20SQCM/<: CPT | Performed by: PODIATRIST

## 2025-07-22 PROCEDURE — 73630 X-RAY EXAM OF FOOT: CPT

## 2025-07-22 PROCEDURE — 97597 DBRDMT OPN WND 1ST 20 CM/<: CPT | Performed by: PODIATRIST

## 2025-07-22 PROCEDURE — 99214 OFFICE O/P EST MOD 30 MIN: CPT | Performed by: PODIATRIST

## 2025-07-22 RX ORDER — LIDOCAINE 40 MG/G
CREAM TOPICAL ONCE
Status: COMPLETED | OUTPATIENT
Start: 2025-07-22 | End: 2025-07-22

## 2025-07-22 RX ORDER — CEPHALEXIN 500 MG/1
500 CAPSULE ORAL EVERY 6 HOURS SCHEDULED
Qty: 28 CAPSULE | Refills: 0 | Status: SHIPPED | OUTPATIENT
Start: 2025-07-22 | End: 2025-07-29

## 2025-07-22 RX ADMIN — LIDOCAINE: 40 CREAM TOPICAL at 15:46

## 2025-07-22 NOTE — PROGRESS NOTES
"Debridement   Wound 05/13/25 Diabetic Ulcer Toe D1, great Right;Plantar     Date/Time: 7/22/2025 2:45 PM    Universal Protocol:  procedure performed by consultantConsent: Verbal consent obtained  Risks and benefits: risks, benefits and alternatives were discussed  Consent given by: patient  Time out: Immediately prior to procedure a \"time out\" was called to verify the correct patient, procedure, equipment, support staff and site/side marked as required.  Patient understanding: patient states understanding of the procedure being performed  Patient identity confirmed: verbally with patient    Debridement Details  Performed by: physician  Debridement type: selective  Pain control: lidocaine 4%    Post-debridement measurements  Length (cm): 0.4  Width (cm): 1  Depth (cm): 0.2  Percent debrided: 100%  Surface Area (cm^2): 0.31  Area Debrided (cm^2): 0.31  Volume (cm^3): 0.04    Devitalized tissue debrided: callus and fibrin  Instrument(s) utilized: curette  Technique utilized: nonexcisional  Bleeding: none  Hemostasis obtained with: not applicable  Procedural pain (0-10): 0  Post-procedural pain: 0   Response to treatment: procedure was tolerated well        "

## 2025-07-22 NOTE — ASSESSMENT & PLAN NOTE
Right foot Xeroform  Left foot Xeroform     -RLE GT plantar diabetic ulceration.                - Selectively debrided at today's evaluation.  No SOI              - Continue with specialized offloading surgical shoe and dressing changes.  Surgical shoe further offloaded              - Xeroform, DSD              - Patient working on transportation so he could be eligible for TCC cast to right lower extremity in the future   - Rx x-ray right foot     - Left great toe DFU open again at today's visit   - Selective debridement performed   - Local erythema and edema concerning for superficial SOI  -Rx Keflex 500 mg 4 times a day for 1 week              - Xeroform, DSD   - Rx x-ray left foot    - Rx cam boot for further immobilization and offloading     -Reviewed with patient at length importance of offloading for wound healing and continued offloading to prevent reulceration.  Patient has been noncompliant with weightbearing restrictions  -Reviewed patient may benefit from first MPJ surgical intervention in the future to increase range of motion at the level of the joint and to prevent biomechanical pressure points.  Patient would need better glycemic control as well as smoking cessation.  Patient would also need reevaluation to rule out any underlying infection.  -Counseled that he is at a high risk of wound recurrence due to chronic wounds in the area and biomechanics causing significant pressure to the area.  Reviewed need for continued offloading in shoes and patient advised he cannot walk barefoot or in regular shoes without offloading  -Smoking cessation reviewed again at today's evaluation  - Patient counseled on the need for continued strict glycemic control.  A1c 8% 4/30/2025.  A1c 6/10/2025 6.5% [facility]  - Patient counseled that he is at a high risk for further infection and osteomyelitis.  This may result in need for surgical intervention such as amputation  - Patient counseled on worsening signs of infection and  will present to the ED if these occur     - Follow-up 1 weeks for reevaluation of right great toe ulceration    Lab Results   Component Value Date    HGBA1C 8 (H) 04/03/2025         Lab Results   Component Value Date    HGBA1C 8 (H) 04/03/2025       Orders:    Wound cleansing and dressings; Future    Cam Boot    XR foot 3+ vw left; Future    XR foot 3+ vw right; Future    cephalexin (KEFLEX) 500 mg capsule; Take 1 capsule (500 mg total) by mouth every 6 (six) hours for 7 days    lidocaine (LMX) 4 % cream    Wound Procedure Treatment

## 2025-07-22 NOTE — PATIENT INSTRUCTIONS
Orders Placed This Encounter   Procedures    Wound cleansing and dressings     Wash your hands with soap and water.  Remove old dressing, discard into plastic bag and place in trash.  Cleanse the wound with mild soap and water prior to applying a clean dressing. Do not use tissue or cotton balls. Do not scrub the wound. Pat dry using gauze.  Shower no may use cast cover to shower     Apply Xerofoam to the bilateral great toe wounds.  Cover with gauze  Secure with carolee and tape  Change dressing daily  Please  cam boot at podiatry office   Please wear on left foot  Wear surgical shoe on right foot   Obtain X-Ray as ordered  Take oral antibiotic as prescribed       Follow up in 1 week     Standing Status:   Future     Expected Date:   7/29/2025     Expiration Date:   7/29/2025

## 2025-07-22 NOTE — PROGRESS NOTES
Name: Beau Daly      : 1980      MRN: [id number]  Encounter Provider: Anjali Titus DPM  Encounter Date: 2025   Encounter department: [facility] WOUND CARE  :  Assessment & Plan  Diabetic ulcer of right great toe (HCC)  Diabetic ulcer of left great toe (HCC)  Right foot Xeroform  Left foot Xeroform     -RLE GT plantar diabetic ulceration.                - Selectively debrided at today's evaluation.  No SOI              - Continue with specialized offloading surgical shoe and dressing changes.  Surgical shoe further offloaded              - Xeroform, DSD              - Patient working on transportation so he could be eligible for TCC cast to right lower extremity in the future   - Rx x-ray right foot     - Left great toe DFU open again at today's visit   - Selective debridement performed   - Local erythema and edema concerning for superficial SOI  -Rx Keflex 500 mg 4 times a day for 1 week              - Xeroform, DSD   - Rx x-ray left foot    - Rx cam boot for further immobilization and offloading     -Reviewed with patient at length importance of offloading for wound healing and continued offloading to prevent reulceration.  Patient has been noncompliant with weightbearing restrictions  -Reviewed patient may benefit from first MPJ surgical intervention in the future to increase range of motion at the level of the joint and to prevent biomechanical pressure points.  Patient would need better glycemic control as well as smoking cessation.  Patient would also need reevaluation to rule out any underlying infection.  -Counseled that he is at a high risk of wound recurrence due to chronic wounds in the area and biomechanics causing significant pressure to the area.  Reviewed need for continued offloading in shoes and patient advised he cannot walk barefoot or in regular shoes without offloading  -Smoking cessation reviewed again at today's evaluation  - Patient counseled on  the need for continued strict glycemic control.  A1c 8% 4/30/2025.  A1c 6/10/2025 6.5% [facility]  - Patient counseled that he is at a high risk for further infection and osteomyelitis.  This may result in need for surgical intervention such as amputation  - Patient counseled on worsening signs of infection and will present to the ED if these occur     - Follow-up 1 weeks for reevaluation of right great toe ulceration    Lab Results   Component Value Date    HGBA1C 8 (H) 04/03/2025         Lab Results   Component Value Date    HGBA1C 8 (H) 04/03/2025       Orders:    Wound cleansing and dressings; Future    Cam Boot    XR foot 3+ vw left; Future    XR foot 3+ vw right; Future    cephalexin (KEFLEX) 500 mg capsule; Take 1 capsule (500 mg total) by mouth every 6 (six) hours for 7 days    lidocaine (LMX) 4 % cream    Wound Procedure Treatment        History of Present Illness   Chief Complaint   Patient presents with    Follow Up Wound Care Visit     Wound ney great toe   Here for wound follow up.  HPI  Patient presents for further evaluation and management of right great toe chronic diabetic ulceration, patient now with reulceration of left great toe chronic diabetic ulceration.  Patient states that his surgical shoe started to breakdown more so he has been wearing his regular diabetic shoes.  They are offloaded however not as well as his surgical shoes.  Patient states he has done some extra activities and extra walking.  He noticed pain and irritation in the small amount of drainage reoccurring at his left great toe.  He denies any proximal extending erythema or edema.  He states he has been compliant with right foot dressing changes.  Patient denies any nausea, vomiting, fever, chills, shortness of breath  Objective   /80   Pulse 88   Temp (!) 96 °F (35.6 °C)   Resp 18     Physical Exam  Constitutional:       General: He is not in acute distress.     Appearance: He is not ill-appearing.   Cardiovascular:       Comments: DP pulse 2/4, PT pulse nonpalpable  CRT to distal digits less than 3 seconds  Skin temperature gradient decreased from knees to digits bilaterally  Absent pedal hair growth  Hemosiderin deposits noted to bilateral lower extremities.  No significant edema noted  Musculoskeletal:      Comments: Mild pes planus bilaterally  Hallux rigidus noted to bilateral first MPJs with palpable bony prominences dorsal medial aspect of bilateral MPJs  MMT 5/5 at level of ankle.  No pain with palpation of posterior calves   Skin:     Capillary Refill: Capillary refill takes less than 2 seconds.      Comments:   Left great toe plantar IPJ diabetic ulceration open again at today's visit.  Blister with underlying serosanguineous drainage.  Fibrogranular base with hyperkeratotic periwound.  Some surrounding edema and erythema noted.  Does not extend to dorsal aspect of great toe      Right great toe plantar IPJ ulceration.  Fibrogranular base.  Overlying friction blister due to hyperkeratotic periwound.  No erythema or edema, no drainage, no purulence, no crepitus or fluctuance  Wound 05/13/25 Diabetic Ulcer Toe D1, great Right;Plantar (Active)   Wound Image   07/22/25 1532   Enter Castillo score: Castillo Grade 1: Partial or full-thickness ulcer (superficial) 07/22/25 1521   Wound Description [place];Pale;Slough 07/22/25 1521   Non-staged Wound Description Full thickness 07/22/25 1521   Wound Length (cm) 0.4 cm 07/22/25 1521   Wound Width (cm) 1 cm 07/22/25 1521   Wound Depth (cm) 0.2 cm 07/22/25 1521   Wound Surface Area (cm^2) 0.31 cm^2 07/22/25 1521   Wound Volume (cm^3) 0.042 cm^3 07/22/25 1521   Calculated Wound Volume (cm^3) 0.08 cm^3 07/22/25 1521   Change in Wound Size % -300 07/22/25 1521   Number of underminings 1 07/17/25 1327   Undermining 1 0.4 07/17/25 1327   Undermining 1 is depth extending from 3-10  deepest 4 07/17/25 1327   Drainage Amount Small 07/22/25 1521   Drainage Description Serosanguineous 07/17/25 1327  "  Viola-wound Assessment Callus;Black;Brown 07/22/25 1521   Dressing Status Intact 06/03/25 1330       Wound 07/22/25 Toe D1, great Left;Plantar (Active)   Wound Image   07/22/25 1532   Wound Description Pink 07/22/25 1519   Non-staged Wound Description Full thickness 07/22/25 1519   Wound Length (cm) 1 cm 07/22/25 1548   Wound Width (cm) 0.5 cm 07/22/25 1548   Wound Depth (cm) 0.1 cm 07/22/25 1548   Wound Surface Area (cm^2) 0.39 cm^2 07/22/25 1548   Wound Volume (cm^3) 0.026 cm^3 07/22/25 1548   Calculated Wound Volume (cm^3) 0.05 cm^3 07/22/25 1548   Drainage Amount Small 07/22/25 1519   Drainage Description Bloody;Tan 07/22/25 1519   Viola-wound Assessment Callus;Brown 07/22/25 1519       Debridement   Wound 07/22/25 Toe D1, great Left;Plantar     Date/Time: 7/22/2025 2:45 PM    Universal Protocol:  procedure performed by consultantConsent: Verbal consent obtained  Risks and benefits: risks, benefits and alternatives were discussed  Consent given by: patient  Time out: Immediately prior to procedure a \"time out\" was called to verify the correct patient, procedure, equipment, support staff and site/side marked as required.  Patient understanding: patient states understanding of the procedure being performed  Patient identity confirmed: verbally with patient    Debridement Details  Debridement type: surgical  Level of debridement: subcutaneous tissue  Pain control: lidocaine 4%    Post-debridement measurements  Length (cm): 2  Width (cm): 0.75  Depth (cm): 0.3  Percent debrided: 100%  Surface Area (cm^2): 1.18  Area Debrided (cm^2): 1.18  Volume (cm^3): 0.24    Tissue and other material debrided: dermis, epidermis and subcutaneous tissue  Devitalized tissue debrided: callus  Instrument(s) utilized: blade, curette and forceps  Technique utilized: excisional  Bleeding: small  Hemostasis obtained with: pressure  Procedural pain (0-10): 1  Post-procedural pain: 0   Response to treatment: procedure was tolerated well     "

## 2025-07-22 NOTE — PROGRESS NOTES
Wound Procedure Treatment    Performed by: Amanda Casanova RN  Authorized by: Anjali Titus DPM  Associated wounds:   Wound 05/13/25 Diabetic Ulcer Toe D1, great Right;Plantar  Wound 07/22/25 Toe D1, great Left;Plantar    Wound cleansed with:  NSS   Applied primary dressing:  Non adherent contact layer   Applied secondary dressing:  Gauze   Dressing secured with:  Kendra   Offloading device appllied:  CAM and Surgical shoe   Comments:  Xeroform

## 2025-07-29 ENCOUNTER — OFFICE VISIT (OUTPATIENT)
Dept: WOUND CARE | Facility: CLINIC | Age: 45
End: 2025-07-29
Payer: COMMERCIAL

## 2025-07-29 VITALS
DIASTOLIC BLOOD PRESSURE: 88 MMHG | HEART RATE: 82 BPM | SYSTOLIC BLOOD PRESSURE: 148 MMHG | RESPIRATION RATE: 18 BRPM | TEMPERATURE: 97.5 F

## 2025-07-29 DIAGNOSIS — L97.529 DIABETIC ULCER OF LEFT GREAT TOE (HCC): ICD-10-CM

## 2025-07-29 DIAGNOSIS — E11.621 DIABETIC ULCER OF LEFT GREAT TOE (HCC): ICD-10-CM

## 2025-07-29 DIAGNOSIS — E11.621 DIABETIC ULCER OF RIGHT GREAT TOE (HCC): Primary | ICD-10-CM

## 2025-07-29 DIAGNOSIS — L97.519 DIABETIC ULCER OF RIGHT GREAT TOE (HCC): Primary | ICD-10-CM

## 2025-07-29 PROCEDURE — 97597 DBRDMT OPN WND 1ST 20 CM/<: CPT | Performed by: PODIATRIST

## 2025-07-29 PROCEDURE — 99214 OFFICE O/P EST MOD 30 MIN: CPT | Performed by: PODIATRIST

## 2025-07-29 RX ORDER — LIDOCAINE 40 MG/G
CREAM TOPICAL ONCE
Status: COMPLETED | OUTPATIENT
Start: 2025-07-29 | End: 2025-07-29

## 2025-07-29 RX ORDER — DOXYCYCLINE 100 MG/1
100 TABLET ORAL 2 TIMES DAILY
Qty: 14 TABLET | Refills: 0 | Status: SHIPPED | OUTPATIENT
Start: 2025-07-29 | End: 2025-08-05

## 2025-07-29 RX ADMIN — LIDOCAINE: 40 CREAM TOPICAL at 08:23

## 2025-08-14 ENCOUNTER — HOSPITAL ENCOUNTER (OUTPATIENT)
Dept: MRI IMAGING | Facility: HOSPITAL | Age: 45
Discharge: HOME | End: 2025-08-14
Attending: PODIATRIST
Payer: COMMERCIAL

## 2025-08-14 ENCOUNTER — OFFICE VISIT (OUTPATIENT)
Dept: WOUND CARE | Facility: CLINIC | Age: 45
End: 2025-08-14
Payer: COMMERCIAL

## 2025-08-14 ENCOUNTER — HOSPITAL ENCOUNTER (OUTPATIENT)
Dept: RADIOLOGY | Facility: HOSPITAL | Age: 45
End: 2025-08-14
Payer: COMMERCIAL

## 2025-08-21 ENCOUNTER — OFFICE VISIT (OUTPATIENT)
Dept: WOUND CARE | Facility: CLINIC | Age: 45
End: 2025-08-21
Payer: COMMERCIAL

## 2025-08-21 VITALS
SYSTOLIC BLOOD PRESSURE: 144 MMHG | DIASTOLIC BLOOD PRESSURE: 80 MMHG | HEART RATE: 88 BPM | RESPIRATION RATE: 18 BRPM | TEMPERATURE: 96.5 F

## 2025-08-21 DIAGNOSIS — E11.621 DIABETIC ULCER OF LEFT GREAT TOE (HCC): ICD-10-CM

## 2025-08-21 DIAGNOSIS — L97.519 DIABETIC ULCER OF RIGHT GREAT TOE (HCC): Primary | ICD-10-CM

## 2025-08-21 DIAGNOSIS — L97.529 DIABETIC ULCER OF LEFT GREAT TOE (HCC): ICD-10-CM

## 2025-08-21 DIAGNOSIS — E11.621 DIABETIC ULCER OF RIGHT GREAT TOE (HCC): Primary | ICD-10-CM

## 2025-08-21 PROCEDURE — 97597 DBRDMT OPN WND 1ST 20 CM/<: CPT | Performed by: PODIATRIST

## 2025-08-21 RX ORDER — LIDOCAINE 40 MG/G
CREAM TOPICAL ONCE
Status: COMPLETED | OUTPATIENT
Start: 2025-08-21 | End: 2025-08-21

## 2025-08-21 RX ADMIN — LIDOCAINE: 40 CREAM TOPICAL at 15:08
